# Patient Record
Sex: MALE | Race: WHITE | NOT HISPANIC OR LATINO | Employment: UNEMPLOYED
[De-identification: names, ages, dates, MRNs, and addresses within clinical notes are randomized per-mention and may not be internally consistent; named-entity substitution may affect disease eponyms.]

---
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
## 2022-11-21 ENCOUNTER — HEALTH MAINTENANCE LETTER (OUTPATIENT)
Age: 22
End: 2022-11-21

## 2023-11-25 ENCOUNTER — HEALTH MAINTENANCE LETTER (OUTPATIENT)
Age: 23
End: 2023-11-25

## 2024-08-08 DIAGNOSIS — Q67.6 PECTUS EXCAVATUM: Primary | ICD-10-CM

## 2024-08-15 ENCOUNTER — HOSPITAL ENCOUNTER (OUTPATIENT)
Dept: CT IMAGING | Facility: CLINIC | Age: 24
Discharge: HOME | End: 2024-08-15
Attending: CLINICAL NURSE SPECIALIST
Payer: MEDICARE

## 2024-08-15 ENCOUNTER — HOSPITAL ENCOUNTER (OUTPATIENT)
Dept: CARDIOLOGY | Facility: CLINIC | Age: 24
Discharge: HOME | End: 2024-08-15
Attending: CLINICAL NURSE SPECIALIST
Payer: MEDICARE

## 2024-08-15 DIAGNOSIS — Q67.6 PECTUS EXCAVATUM: ICD-10-CM

## 2024-08-15 PROCEDURE — 71250 CT THORAX DX C-: CPT | Mod: 26 | Performed by: RADIOLOGY

## 2024-08-15 PROCEDURE — 93321 DOPPLER ECHO F-UP/LMTD STD: CPT | Mod: 26 | Performed by: INTERNAL MEDICINE

## 2024-08-15 PROCEDURE — 93325 DOPPLER ECHO COLOR FLOW MAPG: CPT | Mod: 26 | Performed by: INTERNAL MEDICINE

## 2024-08-15 PROCEDURE — 93350 STRESS TTE ONLY: CPT | Mod: 26 | Performed by: INTERNAL MEDICINE

## 2024-08-15 PROCEDURE — 93325 DOPPLER ECHO COLOR FLOW MAPG: CPT | Mod: TC

## 2024-08-15 PROCEDURE — 93018 CV STRESS TEST I&R ONLY: CPT | Performed by: INTERNAL MEDICINE

## 2024-08-15 PROCEDURE — 93016 CV STRESS TEST SUPVJ ONLY: CPT | Performed by: INTERNAL MEDICINE

## 2024-08-15 PROCEDURE — G1010 CDSM STANSON: HCPCS | Mod: GC | Performed by: RADIOLOGY

## 2024-08-15 PROCEDURE — 71250 CT THORAX DX C-: CPT | Mod: MG

## 2024-08-27 NOTE — PROGRESS NOTES
THORACIC SURGERY FOLLOW UP VISIT    Dear Dr. Nur,  I saw Mr. Riccardo Urias in follow-up today. The clinical summary follows:     DIAGNOSIS   Pectus excavatum    INTERVAL STUDIES  None     Previsit tests  CT scan of the chest 8/11/21. Yvon index 3.9         Exercise echo 7/1921  Unable to reach target HR due to fatigue. Blunted heart rate and normal BP response to exercise.      Pulmonary function Tests (7/17/2020):   * Limited by patient's inability to perform an adequate breath hold.   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     PSH  Tracheostomy     Allergies   Allergen Reactions    Dust Mite Extract     Pollen Extract      Current Outpatient Medications   Medication Sig Dispense Refill    FLUoxetine (PROZAC) 10 MG capsule Take 10 mg by mouth      FLUoxetine (PROZAC) 20 MG capsule TAKE 1 CAPSULE BY MOUTH ONE TIME A DAY. TAKE ONE WITH ONE 10MG TAB       No current facility-administered medications for this visit.     Social History     Tobacco Use    Smoking status: Never    Smokeless tobacco: Never       SUBJECTIVE  Riccardo comes to clinic with his mom for a follow up. I saw them a couple of years ago due to exertional dyspnea. They decided to wait because of his age (22 y/o), but now they are interested in moving forward with surgery. His main complain is that his mom notices him to be short of breath after exercising. No chest wall pain.     From a personal perspective, he comes with his mom.     IMPRESSION   24 year old male patient with history of TBI and pectus excavatum.     PLAN  I spent 30 min on the date of the encounter in chart review, patient visit, review of tests, documentation and/or discussion with other providers about the issues documented above. I reviewed the plan as follows:  Procedure planned: Modified Ravitch repair with retrosternal bar and sternal plating.   Necessary tests and appointments: They  would like to wait until May next year after a trip to [place]. I will see her in clinic in June 2025 for a preop.   All questions were answered and the patient and present family were in agreement with the plan.  I appreciate the opportunity to participate in the care of your patient and will keep you updated.  Sincerely,  Kristi Gibson MD

## 2024-08-28 ENCOUNTER — ONCOLOGY VISIT (OUTPATIENT)
Dept: SURGERY | Facility: CLINIC | Age: 24
End: 2024-08-28
Attending: THORACIC SURGERY (CARDIOTHORACIC VASCULAR SURGERY)
Payer: MEDICARE

## 2024-08-28 VITALS
HEART RATE: 68 BPM | DIASTOLIC BLOOD PRESSURE: 74 MMHG | BODY MASS INDEX: 18.03 KG/M2 | SYSTOLIC BLOOD PRESSURE: 113 MMHG | OXYGEN SATURATION: 94 % | RESPIRATION RATE: 16 BRPM | WEIGHT: 130.2 LBS | TEMPERATURE: 97.6 F

## 2024-08-28 DIAGNOSIS — Q67.6 PECTUS EXCAVATUM: Primary | ICD-10-CM

## 2024-08-28 PROCEDURE — G0463 HOSPITAL OUTPT CLINIC VISIT: HCPCS | Performed by: THORACIC SURGERY (CARDIOTHORACIC VASCULAR SURGERY)

## 2024-08-28 PROCEDURE — 99204 OFFICE O/P NEW MOD 45 MIN: CPT | Performed by: THORACIC SURGERY (CARDIOTHORACIC VASCULAR SURGERY)

## 2024-08-28 ASSESSMENT — PAIN SCALES - GENERAL: PAINLEVEL: NO PAIN (0)

## 2024-08-28 NOTE — NURSING NOTE
"Oncology Rooming Note    August 28, 2024 10:35 AM   Riccardo Urias is a 24 year old male who presents for:    Chief Complaint   Patient presents with    Oncology Clinic Visit     Pectus Excavatum     Initial Vitals: /74 (BP Location: Left arm, Patient Position: Sitting, Cuff Size: Adult Regular)   Pulse 68   Temp 97.6  F (36.4  C) (Oral)   Resp 16   Wt 59.1 kg (130 lb 3.2 oz)   SpO2 94%   BMI 18.03 kg/m   Estimated body mass index is 18.03 kg/m  as calculated from the following:    Height as of 8/11/21: 1.81 m (5' 11.26\").    Weight as of this encounter: 59.1 kg (130 lb 3.2 oz). Body surface area is 1.72 meters squared.  No Pain (0) Comment: Data Unavailable   No LMP for male patient.  Allergies reviewed: Yes  Medications reviewed: Yes    Medications: Medication refills not needed today.  Pharmacy name entered into [facility]: [facility] [id number] [place], [place] [address]    Frailty Screening:   Is the patient here for a new oncology consult visit in cancer care? 2. No      Clinical concerns: None       iRchelle Mcclelland LPN  8/28/2024                "

## 2024-08-28 NOTE — LETTER
8/28/2024      Riccardo Urias  [address]  [place] [address]      Dear Colleague,    Thank you for referring your patient, Riccardo Urias, to the [facility]. Please see a copy of my visit note below.    THORACIC SURGERY FOLLOW UP VISIT    Dear Dr. Nur,  I saw Mr. Riccardo Urias in follow-up today. The clinical summary follows:     DIAGNOSIS   Pectus excavatum    INTERVAL STUDIES  None     Previsit tests  CT scan of the chest 8/11/21. Yvon index 3.9         Exercise echo 7/1921  Unable to reach target HR due to fatigue. Blunted heart rate and normal BP response to exercise.      Pulmonary function Tests (7/17/2020):   * Limited by patient's inability to perform an adequate breath hold.   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     PSH  Tracheostomy     Allergies   Allergen Reactions     Dust Mite Extract      Pollen Extract      Current Outpatient Medications   Medication Sig Dispense Refill     FLUoxetine (PROZAC) 10 MG capsule Take 10 mg by mouth       FLUoxetine (PROZAC) 20 MG capsule TAKE 1 CAPSULE BY MOUTH ONE TIME A DAY. TAKE ONE WITH ONE 10MG TAB       No current facility-administered medications for this visit.     Social History     Tobacco Use     Smoking status: Never     Smokeless tobacco: Never       SUBJECTIVE  Riccardo comes to clinic with his mom for a follow up. I saw them a couple of years ago due to exertional dyspnea. They decided to wait because of his age (20 y/o), but now they are interested in moving forward with surgery. His main complain is that his mom notices him to be short of breath after exercising. No chest wall pain.     From a personal perspective, he comes with his mom.     IMPRESSION   24 year old male patient with history of TBI and pectus excavatum.     PLAN  I spent 30 min on the date of the encounter in chart review, patient visit, review of tests,  documentation and/or discussion with other providers about the issues documented above. I reviewed the plan as follows:  Procedure planned: Modified Ravitch repair with retrosternal bar and sternal plating.   Necessary tests and appointments: They would like to wait until May next year after a trip to [place]. I will see her in clinic in June 2025 for a preop.   All questions were answered and the patient and present family were in agreement with the plan.  I appreciate the opportunity to participate in the care of your patient and will keep you updated.  Sincerely,  Kristi Gibson MD      Again, thank you for allowing me to participate in the care of your patient.        Sincerely,        Kenney Pardo MD

## 2025-04-22 ENCOUNTER — PATIENT OUTREACH (OUTPATIENT)
Dept: SURGERY | Facility: CLINIC | Age: 25
End: 2025-04-22
Payer: MEDICARE

## 2025-04-22 NOTE — TELEPHONE ENCOUNTER
"[facility]: Thoracic Surgery                                                                                       Received message from  that patient's mother wanted patient to be seen sooner that June. Reports she stated \"things have changed\".    Called patient's mother to follow-up on the changes. Mother reports that patient is doing fine. No medical changes for patient. States they were originally going to go on a trip to [place] in May, but they are no longer going so they decided to take care of patient's surgery earlier so recovery doesn't extend to far into the summer.     Confirmed patient's clinic visit date of 4/30/2025.   Patient's mother appreciated writer's call.    Sharon Quispe RN, BSN  Thoracic Surgery RN Care Coordinator    "

## 2025-04-29 NOTE — PROGRESS NOTES
THORACIC SURGERY FOLLOW UP VISIT    I saw Mr. Riccardo Urias in follow-up today. The clinical summary follows:      DIAGNOSIS   Pectus excavatum    INTERVAL STUDIES  None    Previsit tests  CT scan of the chest 8/11/21. Yvon index 3.9         Exercise echo 7/1921  Unable to reach target HR due to fatigue. Blunted heart rate and normal BP response to exercise.      Pulmonary function Tests (7/17/2020)  * Limited by patient's inability to perform an adequate breath hold.   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     PSH  None       Allergies   Allergen Reactions    Dust Mite Extract     Pollen Extract      Current Outpatient Medications   Medication Sig Dispense Refill    FLUoxetine (PROZAC) 20 MG capsule TAKE 1 CAPSULE BY MOUTH ONE TIME A DAY. TAKE ONE WITH ONE 10MG TAB       No current facility-administered medications for this visit.     Social History     Tobacco Use    Smoking status: Never    Smokeless tobacco: Never       SUBJECTIVE  Riccardo comes to clinic for a preop appointment. He has no new problems.     From a personal perspective, he comes with his mom and dad. He had a TBI and some residual cognitive issues.     IMPRESSION   25 year-old M with pectus excavatum.    PLAN  I spent 30 min on the date of the encounter in chart review, patient visit, review of tests, documentation and/or discussion with other providers about the issues documented above. I reviewed the plan as follows:  Procedure planned: Modified Ravitch repair with sternal plating. Intercostal nerve cryoablation.   Necessary Tests & Appointments: PAC.   Pain Control Plan: Intercostal nerve cryoablation.   Anticoagulation Plan: Enoxaparin postop.    I had an extensive discussion with the patient and his family regarding the rationale for surgery, the alternatives risks and benefits of the procedure. I explained the expected hospital stay, postoperative  course, recovery time, diet and activity restrictions. Informed consent was obtained and they agreed to proceed.  All questions were answered and the patient and present family were in agreement with the plan.  I appreciate the opportunity to participate in the care of your patient and will keep you updated.  Sincerely,  Kristi Gibson MD

## 2025-04-30 ENCOUNTER — ONCOLOGY VISIT (OUTPATIENT)
Dept: SURGERY | Facility: CLINIC | Age: 25
End: 2025-04-30
Attending: THORACIC SURGERY (CARDIOTHORACIC VASCULAR SURGERY)
Payer: MEDICARE

## 2025-04-30 ENCOUNTER — PREP FOR PROCEDURE (OUTPATIENT)
Dept: SURGERY | Facility: CLINIC | Age: 25
End: 2025-04-30
Payer: MEDICARE

## 2025-04-30 ENCOUNTER — PREP FOR PROCEDURE (OUTPATIENT)
Dept: SURGERY | Facility: CLINIC | Age: 25
End: 2025-04-30

## 2025-04-30 VITALS
TEMPERATURE: 97 F | HEART RATE: 92 BPM | BODY MASS INDEX: 18.47 KG/M2 | WEIGHT: 133.4 LBS | RESPIRATION RATE: 16 BRPM | OXYGEN SATURATION: 97 % | SYSTOLIC BLOOD PRESSURE: 101 MMHG | DIASTOLIC BLOOD PRESSURE: 72 MMHG

## 2025-04-30 DIAGNOSIS — Q67.6 PECTUS EXCAVATUM: Primary | ICD-10-CM

## 2025-04-30 PROCEDURE — 99214 OFFICE O/P EST MOD 30 MIN: CPT | Performed by: THORACIC SURGERY (CARDIOTHORACIC VASCULAR SURGERY)

## 2025-04-30 PROCEDURE — G0463 HOSPITAL OUTPT CLINIC VISIT: HCPCS | Performed by: THORACIC SURGERY (CARDIOTHORACIC VASCULAR SURGERY)

## 2025-04-30 RX ORDER — ACETAMINOPHEN 325 MG/1
975 TABLET ORAL ONCE
OUTPATIENT
Start: 2025-04-30 | End: 2025-04-30

## 2025-04-30 ASSESSMENT — PAIN SCALES - GENERAL: PAINLEVEL_OUTOF10: NO PAIN (0)

## 2025-04-30 NOTE — NURSING NOTE
"Oncology Rooming Note    April 30, 2025 2:36 PM   Riccardo Urias is a 25 year old male who presents for:    Chief Complaint   Patient presents with    Oncology Clinic Visit     pectus excavatum     Initial Vitals: There were no vitals taken for this visit. Estimated body mass index is 18.03 kg/m  as calculated from the following:    Height as of 8/11/21: 1.81 m (5' 11.26\").    Weight as of 8/28/24: 59.1 kg (130 lb 3.2 oz). There is no height or weight on file to calculate BSA.  No Pain (0) Comment: Data Unavailable   No LMP for male patient.  Allergies reviewed: Yes  Medications reviewed: Yes    Medications: Medication refills not needed today.  Pharmacy name entered into [employer]: [facility] [id number] [place] [address] [address]    Frailty Screening:   Is the patient here for a new oncology consult visit in cancer care? 2. No    PHQ9:  Did this patient require a PHQ9?: No      Clinical concerns: Pt would like to know if they are able to be sedated prior to any injections on the day of surgery. Pt would also like to discuss recovery time.      Pat Daniel              "

## 2025-04-30 NOTE — LETTER
4/30/2025      Riccardo Urias  [address]  [place] [address]      Dear Colleague,    Thank you for referring your patient, Riccardo Urias, to the [facility]. Please see a copy of my visit note below.    THORACIC SURGERY FOLLOW UP VISIT    I saw Mr. Riccardo Urias in follow-up today. The clinical summary follows:      DIAGNOSIS   Pectus excavatum    INTERVAL STUDIES  None    Previsit tests  CT scan of the chest 8/11/21. Yvon index 3.9         Exercise echo 7/1921  Unable to reach target HR due to fatigue. Blunted heart rate and normal BP response to exercise.      Pulmonary function Tests (7/17/2020)  * Limited by patient's inability to perform an adequate breath hold.   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     PSH  None       Allergies   Allergen Reactions     Dust Mite Extract      Pollen Extract      Current Outpatient Medications   Medication Sig Dispense Refill     FLUoxetine (PROZAC) 20 MG capsule TAKE 1 CAPSULE BY MOUTH ONE TIME A DAY. TAKE ONE WITH ONE 10MG TAB       No current facility-administered medications for this visit.     Social History     Tobacco Use     Smoking status: Never     Smokeless tobacco: Never       SUBJECTIVE  Riccardo comes to clinic for a preop appointment. He has no new problems.     From a personal perspective, he comes with his mom and dad. He had a TBI and some residual cognitive issues.     IMPRESSION   25 year-old M with pectus excavatum.    PLAN  I spent 30 min on the date of the encounter in chart review, patient visit, review of tests, documentation and/or discussion with other providers about the issues documented above. I reviewed the plan as follows:  Procedure planned: Modified Ravitch repair with sternal plating. Intercostal nerve cryoablation.   Necessary Tests & Appointments: PAC.   Pain Control Plan: Intercostal nerve cryoablation.    Anticoagulation Plan: Enoxaparin postop.    I had an extensive discussion with the patient and his family regarding the rationale for surgery, the alternatives risks and benefits of the procedure. I explained the expected hospital stay, postoperative course, recovery time, diet and activity restrictions. Informed consent was obtained and they agreed to proceed.  All questions were answered and the patient and present family were in agreement with the plan.  I appreciate the opportunity to participate in the care of your patient and will keep you updated.  Sincerely,  Kristi Gibson MD      Again, thank you for allowing me to participate in the care of your patient.        Sincerely,        Kenney Pardo MD    Electronically signed

## 2025-05-01 DIAGNOSIS — Q67.6 PECTUS EXCAVATUM: Primary | ICD-10-CM

## 2025-05-06 ENCOUNTER — TELEPHONE (OUTPATIENT)
Dept: SURGERY | Facility: CLINIC | Age: 25
End: 2025-05-06
Payer: MEDICARE

## 2025-05-06 NOTE — TELEPHONE ENCOUNTER
Spoke with patient to schedule procedure with Dr. Pardo   Procedure was scheduled on 6/9-wait list for 6/6 at [facility] OR  Patient will have H&P with PAC    Informed pt of surgery details:  Date:    Monday, June 09, 2025  Arrival Time:  5:30 am    Pt is aware surgery start time may change, and someone will reach out with the new arrival time, if so.    Patient is aware a COVID-19 test is needed before their procedure ONLY IF symptomatic.   (Patient is aware Thoracic is no longer requiring COVID-19 test)       Patient is aware a / is needed day of surgery.   Surgery Letter was sent via [employer],     Patient has my direct contact information for any further questions.      Pt's mom is requesting surgery on a Friday if possible. Informed Loraine 6/6 may be possible but writer would need to confirm that partner not adding cases this day. Loraine agreed to 6/9 and understands writer will be in contact if able to move pt up to 6/6.    Jeana Vo on 5/6/2025 at 4:47 PM

## 2025-05-07 NOTE — TELEPHONE ENCOUNTER
FUTURE VISIT INFORMATION      SURGERY INFORMATION:  Date: 6/9/2025   Location: [facility] OR   Surgeon:  Kristi Lezama MD   Anesthesia Type:  General   Procedure: Modified Ravitch repair with retrosternal bar placement and sternal plating. Intercostal nerve cryoablation   Consult: 4/30/25    RECORDS REQUESTED FROM:       Primary Care Provider: Marilyn Nur MD    Pertinent Medical History: Pectus excavatum;     No records to collect.

## 2025-05-12 LAB
ABO + RH BLD: NORMAL
BLD GP AB SCN SERPL QL: NEGATIVE
SPECIMEN EXP DATE BLD: NORMAL

## 2025-05-13 ENCOUNTER — LAB (OUTPATIENT)
Dept: LAB | Facility: CLINIC | Age: 25
End: 2025-05-13
Payer: COMMERCIAL

## 2025-05-13 ENCOUNTER — APPOINTMENT (OUTPATIENT)
Dept: LAB | Facility: CLINIC | Age: 25
End: 2025-05-13
Payer: MEDICARE

## 2025-05-13 ENCOUNTER — OFFICE VISIT (OUTPATIENT)
Dept: SURGERY | Facility: CLINIC | Age: 25
End: 2025-05-13
Payer: COMMERCIAL

## 2025-05-13 ENCOUNTER — PRE VISIT (OUTPATIENT)
Dept: SURGERY | Facility: CLINIC | Age: 25
End: 2025-05-13

## 2025-05-13 VITALS
BODY MASS INDEX: 18.79 KG/M2 | TEMPERATURE: 98.1 F | OXYGEN SATURATION: 93 % | WEIGHT: 134.2 LBS | RESPIRATION RATE: 16 BRPM | HEIGHT: 71 IN | DIASTOLIC BLOOD PRESSURE: 62 MMHG | HEART RATE: 77 BPM | SYSTOLIC BLOOD PRESSURE: 97 MMHG

## 2025-05-13 DIAGNOSIS — Z01.818 PREOP EXAMINATION: ICD-10-CM

## 2025-05-13 DIAGNOSIS — Q67.6 PECTUS EXCAVATUM: ICD-10-CM

## 2025-05-13 DIAGNOSIS — Z01.818 PREOP EXAMINATION: Primary | ICD-10-CM

## 2025-05-13 LAB
ANION GAP SERPL CALCULATED.3IONS-SCNC: 12 MMOL/L (ref 7–15)
BUN SERPL-MCNC: 9 MG/DL (ref 6–20)
CALCIUM SERPL-MCNC: 10.3 MG/DL (ref 8.8–10.4)
CHLORIDE SERPL-SCNC: 102 MMOL/L (ref 98–107)
CREAT SERPL-MCNC: 0.76 MG/DL (ref 0.67–1.17)
EGFRCR SERPLBLD CKD-EPI 2021: >90 ML/MIN/1.73M2
ERYTHROCYTE [DISTWIDTH] IN BLOOD BY AUTOMATED COUNT: 11.9 % (ref 10–15)
GLUCOSE SERPL-MCNC: 95 MG/DL (ref 70–99)
HCO3 SERPL-SCNC: 30 MMOL/L (ref 22–29)
HCT VFR BLD AUTO: 43.4 % (ref 40–53)
HGB BLD-MCNC: 15.1 G/DL (ref 13.3–17.7)
MCH RBC QN AUTO: 30.9 PG (ref 26.5–33)
MCHC RBC AUTO-ENTMCNC: 34.8 G/DL (ref 31.5–36.5)
MCV RBC AUTO: 89 FL (ref 78–100)
PLATELET # BLD AUTO: 248 10E3/UL (ref 150–450)
POTASSIUM SERPL-SCNC: 3.7 MMOL/L (ref 3.4–5.3)
RBC # BLD AUTO: 4.88 10E6/UL (ref 4.4–5.9)
SODIUM SERPL-SCNC: 144 MMOL/L (ref 135–145)
WBC # BLD AUTO: 5.4 10E3/UL (ref 4–11)

## 2025-05-13 PROCEDURE — 86901 BLOOD TYPING SEROLOGIC RH(D): CPT

## 2025-05-13 PROCEDURE — 80048 BASIC METABOLIC PNL TOTAL CA: CPT | Performed by: PATHOLOGY

## 2025-05-13 PROCEDURE — 36415 COLL VENOUS BLD VENIPUNCTURE: CPT | Performed by: PATHOLOGY

## 2025-05-13 PROCEDURE — 85027 COMPLETE CBC AUTOMATED: CPT | Performed by: PATHOLOGY

## 2025-05-13 ASSESSMENT — LIFESTYLE VARIABLES: TOBACCO_USE: 0

## 2025-05-13 ASSESSMENT — ENCOUNTER SYMPTOMS
SEIZURES: 1
DYSRHYTHMIAS: 0

## 2025-05-13 ASSESSMENT — PAIN SCALES - GENERAL: PAINLEVEL_OUTOF10: NO PAIN (0)
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
## 2025-05-13 NOTE — PATIENT INSTRUCTIONS
Preparing for Your Surgery      Name:  Riccardo Urias   MRN:  [id number]   :  2000   Today's Date:  2025     The [place] [employer] [address] Construction Project                                Timeline 2025 -2025    This project will affect travel to the [facility] and [facility], as well as the [facility].      Please check the [facility] [address] project website for the most up to date information and give yourself additional time to reach your destination.        Arriving for surgery:  Surgery date:  2025  Arrival time:  0900      Please come to:         [facility]    [address] [place]  [address]     The [facility] [place] Patient/Visitor Ramp is at [address]. Patients and visitors who self-park will receive the reduced hospital parking rate. If the Patient /Visitor Ramp is full, please follow the signs to the [employer] car park located at the [facility] entrance.      [place] parking is available (24 hours/ 7 days a week)      Discounted parking pass options are available for patients and visitors. They can be purchased at the [employer] desk at the [facility] entrance.     -    Stop at the security desk and they will direct surgery patients to the [facility] Check [facility] [phone]        - If you need directions, a wheelchair or an escort please stop at the Information/security desk in the lobby.     What can I eat or drink?  -  You may eat and drink normally up to 8 hours prior to arrival time. (Until 1:00 am)  -  You may have clear liquids until 2 hours prior to arrival time. (Until 7:00 am)    Examples of clear liquids:  Water  Clear broth  Juices (apple, white grape, white cranberry  and cider) without pulp  Noncarbonated, powder based beverages  (lemonade and Matias-Aid)  Sodas (Sprite,  7-Up, ginger ale and seltzer)  Coffee or tea (without milk or cream)  Gatorade    -  No Alcohol or cannabis products for at least 24 hours before surgery.     Which medicines can I take?    Hold Aspirin for 7 days before surgery.   Hold Multivitamins for 7 days before surgery.  Hold Supplements for 7 days before surgery.  Hold Ibuprofen (Advil, Motrin) for 1 day(s) before surgery--unless otherwise directed by surgeon.  Hold Naproxen (Aleve) for 4 days before surgery.    -  PLEASE TAKE these medications the day of surgery:   Fluoxetine (Prozac)     How do I prepare myself?  - Please take 2 showers (one the night prior to surgery and one the morning of surgery) using Scrubcare or Hibiclens soap.    Use this soap only from the neck to your toes. Avoid genital area      Leave the soap on your skin for one minute--then rinse thoroughly.      You may use your own shampoo and conditioner. No other hair products.   - Please remove all jewelry and body piercings.  - No lotions, deodorants or fragrance.  - No makeup or fingernail polish.   - Bring your ID and insurance card.    -For patients being admitted to the [facility]  Family members are to take the patient belongings with them and place them in the lockers provided in the Family Lounge.  Please limit the items you bring to 1 bag as the lockers are small.      -If you use a CPAP machine, please bring the CPAP machine, tubing, and mask to hospital.    -If you have a Deep Brain Stimulator, Spinal Cord Stimulator, or any Neuro Stimulator device---you must bring the remote control to the hospital.      ALL PATIENTS GOING HOME THE SAME DAY OF SURGERY ARE REQUIRED TO HAVE A RESPONSIBLE ADULT TO DRIVE AND BE IN ATTENDANCE WITH THEM FOR 24 HOURS FOLLOWING SURGERY.    Covid testing policy as of 12/06/2022  Your surgeon will notify and schedule you for a COVID test if one is needed before surgery--please direct any questions or COVID symptoms to your  surgeon      Questions or Concerns:    - For any questions regarding the day of surgery or your hospital stay, please contact the Pre Admission Nursing Office at [phone].       - If you have health changes between today and your surgery, please call your surgeon.       - For questions after surgery, please call your surgeons office.           Current Visitor Guidelines    2 adult visitors for adult patients in the pre op area    If additional visitors come (beyond a patient care attendant or a group home caregiver), the additional visitors will be asked to wait in the main lobby of the hospital    Visiting hours: 8 a.m. to 8:30 p.m.    Patients confirmed or suspected to have symptoms of COVID 19 or flu:     No visitors allowed for adult patients.   Children (under age 18) can have 1 named visitor.     People who are sick or showing symptoms of COVID 19 or flu:    Are not allowed to visit patients--we can only make exceptions in special situations.       Please follow these guidelines for your visit:          Please maintain social distance          Masking is optional--however at times you may be asked to wear a mask for the safety of yourself and others     Clean your hands with alcohol hand . Do this when you arrive at and leave the building and patient room,    And again after you touch your mask or anything in the room.     Go directly to and from the room you are visiting.     Stay in the patient s room during your visit. Limit going to other places in the hospital as much as possible     Leave bags and jackets at home or in the car.     For everyone s health, please don t come and go during your visit. That includes for smoking   during your visit.

## 2025-06-03 PROBLEM — F43.10 POST TRAUMATIC STRESS DISORDER: Status: ACTIVE | Noted: 2017-04-03

## 2025-06-03 PROBLEM — F41.9 ANXIETY: Status: ACTIVE | Noted: 2017-06-16

## 2025-06-06 ENCOUNTER — APPOINTMENT (OUTPATIENT)
Dept: GENERAL RADIOLOGY | Facility: CLINIC | Age: 25
DRG: 516 | End: 2025-06-06
Payer: MEDICARE

## 2025-06-06 ENCOUNTER — HOSPITAL ENCOUNTER (INPATIENT)
Facility: CLINIC | Age: 25
LOS: 4 days | Discharge: HOME | DRG: 516 | End: 2025-06-10
Attending: THORACIC SURGERY (CARDIOTHORACIC VASCULAR SURGERY) | Admitting: THORACIC SURGERY (CARDIOTHORACIC VASCULAR SURGERY)
Payer: MEDICARE

## 2025-06-06 DIAGNOSIS — Q67.6 PECTUS EXCAVATUM: Primary | ICD-10-CM

## 2025-06-06 LAB
ABO + RH BLD: NORMAL
ANION GAP SERPL CALCULATED.3IONS-SCNC: 9 MMOL/L (ref 7–15)
BLD GP AB SCN SERPL QL: NEGATIVE
BUN SERPL-MCNC: 13 MG/DL (ref 6–20)
CALCIUM SERPL-MCNC: 8.9 MG/DL (ref 8.8–10.4)
CHLORIDE SERPL-SCNC: 105 MMOL/L (ref 98–107)
CREAT SERPL-MCNC: 0.77 MG/DL (ref 0.67–1.17)
EGFRCR SERPLBLD CKD-EPI 2021: >90 ML/MIN/1.73M2
ERYTHROCYTE [DISTWIDTH] IN BLOOD BY AUTOMATED COUNT: 12 % (ref 10–15)
GLUCOSE BLDC GLUCOMTR-MCNC: 97 MG/DL (ref 70–99)
GLUCOSE SERPL-MCNC: 141 MG/DL (ref 70–99)
HCO3 SERPL-SCNC: 27 MMOL/L (ref 22–29)
HCT VFR BLD AUTO: 37.2 % (ref 40–53)
HGB BLD-MCNC: 12.9 G/DL (ref 13.3–17.7)
MAGNESIUM SERPL-MCNC: 1.7 MG/DL (ref 1.7–2.3)
MCH RBC QN AUTO: 30.8 PG (ref 26.5–33)
MCHC RBC AUTO-ENTMCNC: 34.7 G/DL (ref 31.5–36.5)
MCV RBC AUTO: 89 FL (ref 78–100)
PLATELET # BLD AUTO: 217 10E3/UL (ref 150–450)
POTASSIUM SERPL-SCNC: 4 MMOL/L (ref 3.4–5.3)
RADIOLOGIST FLAGS: ABNORMAL
RBC # BLD AUTO: 4.19 10E6/UL (ref 4.4–5.9)
SODIUM SERPL-SCNC: 141 MMOL/L (ref 135–145)
SPECIMEN EXP DATE BLD: NORMAL
WBC # BLD AUTO: 13.4 10E3/UL (ref 4–11)

## 2025-06-06 PROCEDURE — 250N000009 HC RX 250: Performed by: THORACIC SURGERY (CARDIOTHORACIC VASCULAR SURGERY)

## 2025-06-06 PROCEDURE — 83735 ASSAY OF MAGNESIUM: CPT

## 2025-06-06 PROCEDURE — 250N000013 HC RX MED GY IP 250 OP 250 PS 637

## 2025-06-06 PROCEDURE — 86901 BLOOD TYPING SEROLOGIC RH(D): CPT | Performed by: THORACIC SURGERY (CARDIOTHORACIC VASCULAR SURGERY)

## 2025-06-06 PROCEDURE — 71045 X-RAY EXAM CHEST 1 VIEW: CPT | Mod: 26 | Performed by: STUDENT IN AN ORGANIZED HEALTH CARE EDUCATION/TRAINING PROGRAM

## 2025-06-06 PROCEDURE — 999N000141 HC STATISTIC PRE-PROCEDURE NURSING ASSESSMENT: Performed by: THORACIC SURGERY (CARDIOTHORACIC VASCULAR SURGERY)

## 2025-06-06 PROCEDURE — 272N000001 HC OR GENERAL SUPPLY STERILE: Performed by: THORACIC SURGERY (CARDIOTHORACIC VASCULAR SURGERY)

## 2025-06-06 PROCEDURE — 258N000003 HC RX IP 258 OP 636

## 2025-06-06 PROCEDURE — 710N000010 HC RECOVERY PHASE 1, LEVEL 2, PER MIN: Performed by: THORACIC SURGERY (CARDIOTHORACIC VASCULAR SURGERY)

## 2025-06-06 PROCEDURE — 85014 HEMATOCRIT: CPT

## 2025-06-06 PROCEDURE — 86850 RBC ANTIBODY SCREEN: CPT | Performed by: THORACIC SURGERY (CARDIOTHORACIC VASCULAR SURGERY)

## 2025-06-06 PROCEDURE — 370N000017 HC ANESTHESIA TECHNICAL FEE, PER MIN: Performed by: THORACIC SURGERY (CARDIOTHORACIC VASCULAR SURGERY)

## 2025-06-06 PROCEDURE — 250N000011 HC RX IP 250 OP 636: Mod: JZ | Performed by: STUDENT IN AN ORGANIZED HEALTH CARE EDUCATION/TRAINING PROGRAM

## 2025-06-06 PROCEDURE — 82310 ASSAY OF CALCIUM: CPT

## 2025-06-06 PROCEDURE — 36415 COLL VENOUS BLD VENIPUNCTURE: CPT | Performed by: THORACIC SURGERY (CARDIOTHORACIC VASCULAR SURGERY)

## 2025-06-06 PROCEDURE — 85018 HEMOGLOBIN: CPT

## 2025-06-06 PROCEDURE — 80048 BASIC METABOLIC PNL TOTAL CA: CPT

## 2025-06-06 PROCEDURE — 36415 COLL VENOUS BLD VENIPUNCTURE: CPT

## 2025-06-06 PROCEDURE — 250N000013 HC RX MED GY IP 250 OP 250 PS 637: Performed by: THORACIC SURGERY (CARDIOTHORACIC VASCULAR SURGERY)

## 2025-06-06 PROCEDURE — 0PS00ZZ REPOSITION STERNUM, OPEN APPROACH: ICD-10-PCS | Performed by: THORACIC SURGERY (CARDIOTHORACIC VASCULAR SURGERY)

## 2025-06-06 PROCEDURE — 120N000011 HC R&B TRANSPLANT UMMC

## 2025-06-06 PROCEDURE — 250N000025 HC SEVOFLURANE, PER MIN: Performed by: THORACIC SURGERY (CARDIOTHORACIC VASCULAR SURGERY)

## 2025-06-06 PROCEDURE — C1713 ANCHOR/SCREW BN/BN,TIS/BN: HCPCS | Performed by: THORACIC SURGERY (CARDIOTHORACIC VASCULAR SURGERY)

## 2025-06-06 PROCEDURE — 999N000065 XR CHEST PORT 1 VIEW

## 2025-06-06 PROCEDURE — 21740 RECONSTRUCTION OF STERNUM: CPT | Mod: GC | Performed by: THORACIC SURGERY (CARDIOTHORACIC VASCULAR SURGERY)

## 2025-06-06 PROCEDURE — 250N000011 HC RX IP 250 OP 636

## 2025-06-06 PROCEDURE — C2618 PROBE/NEEDLE, CRYO: HCPCS | Performed by: THORACIC SURGERY (CARDIOTHORACIC VASCULAR SURGERY)

## 2025-06-06 PROCEDURE — 360N000076 HC SURGERY LEVEL 3, PER MIN: Performed by: THORACIC SURGERY (CARDIOTHORACIC VASCULAR SURGERY)

## 2025-06-06 PROCEDURE — 01583ZZ DESTRUCTION OF THORACIC NERVE, PERCUTANEOUS APPROACH: ICD-10-PCS | Performed by: THORACIC SURGERY (CARDIOTHORACIC VASCULAR SURGERY)

## 2025-06-06 DEVICE — IMPLANTABLE DEVICE: Type: IMPLANTABLE DEVICE | Site: CHEST | Status: FUNCTIONAL

## 2025-06-06 RX ORDER — HYDROMORPHONE HYDROCHLORIDE 1 MG/ML
.2-.4 INJECTION, SOLUTION INTRAMUSCULAR; INTRAVENOUS; SUBCUTANEOUS
Refills: 0 | Status: DISCONTINUED | OUTPATIENT
Start: 2025-06-06 | End: 2025-06-08

## 2025-06-06 RX ORDER — CEFAZOLIN SODIUM/WATER 2 G/20 ML
2 SYRINGE (ML) INTRAVENOUS
Status: COMPLETED | OUTPATIENT
Start: 2025-06-06 | End: 2025-06-06

## 2025-06-06 RX ORDER — ONDANSETRON 2 MG/ML
4 INJECTION INTRAMUSCULAR; INTRAVENOUS EVERY 30 MIN PRN
Status: DISCONTINUED | OUTPATIENT
Start: 2025-06-06 | End: 2025-06-06 | Stop reason: HOSPADM

## 2025-06-06 RX ORDER — LABETALOL HYDROCHLORIDE 5 MG/ML
10-40 INJECTION, SOLUTION INTRAVENOUS EVERY 10 MIN PRN
Status: DISCONTINUED | OUTPATIENT
Start: 2025-06-06 | End: 2025-06-10 | Stop reason: HOSPADM

## 2025-06-06 RX ORDER — ACETAMINOPHEN 325 MG/1
975 TABLET ORAL EVERY 8 HOURS
Status: DISCONTINUED | OUTPATIENT
Start: 2025-06-06 | End: 2025-06-10 | Stop reason: HOSPADM

## 2025-06-06 RX ORDER — NALOXONE HYDROCHLORIDE 0.4 MG/ML
0.1 INJECTION, SOLUTION INTRAMUSCULAR; INTRAVENOUS; SUBCUTANEOUS
Status: DISCONTINUED | OUTPATIENT
Start: 2025-06-06 | End: 2025-06-06 | Stop reason: HOSPADM

## 2025-06-06 RX ORDER — DEXAMETHASONE SODIUM PHOSPHATE 4 MG/ML
4 INJECTION, SOLUTION INTRA-ARTICULAR; INTRALESIONAL; INTRAMUSCULAR; INTRAVENOUS; SOFT TISSUE
Status: DISCONTINUED | OUTPATIENT
Start: 2025-06-06 | End: 2025-06-06 | Stop reason: HOSPADM

## 2025-06-06 RX ORDER — ALBUTEROL SULFATE 0.83 MG/ML
2.5 SOLUTION RESPIRATORY (INHALATION) EVERY 4 HOURS PRN
Status: DISCONTINUED | OUTPATIENT
Start: 2025-06-06 | End: 2025-06-06 | Stop reason: HOSPADM

## 2025-06-06 RX ORDER — HYDROXYZINE HYDROCHLORIDE 10 MG/1
30-50 TABLET, FILM COATED ORAL EVERY 6 HOURS PRN
Status: DISCONTINUED | OUTPATIENT
Start: 2025-06-06 | End: 2025-06-10 | Stop reason: HOSPADM

## 2025-06-06 RX ORDER — HYDROMORPHONE HCL IN WATER/PF 6 MG/30 ML
0.2 PATIENT CONTROLLED ANALGESIA SYRINGE INTRAVENOUS EVERY 5 MIN PRN
Status: DISCONTINUED | OUTPATIENT
Start: 2025-06-06 | End: 2025-06-06 | Stop reason: HOSPADM

## 2025-06-06 RX ORDER — SODIUM CHLORIDE, SODIUM LACTATE, POTASSIUM CHLORIDE, CALCIUM CHLORIDE 600; 310; 30; 20 MG/100ML; MG/100ML; MG/100ML; MG/100ML
INJECTION, SOLUTION INTRAVENOUS CONTINUOUS
Status: DISCONTINUED | OUTPATIENT
Start: 2025-06-06 | End: 2025-06-07

## 2025-06-06 RX ORDER — ACETAMINOPHEN 325 MG/1
975 TABLET ORAL ONCE
Status: COMPLETED | OUTPATIENT
Start: 2025-06-06 | End: 2025-06-06

## 2025-06-06 RX ORDER — OXYCODONE HYDROCHLORIDE 5 MG/1
5-10 TABLET ORAL EVERY 4 HOURS PRN
Refills: 0 | Status: DISCONTINUED | OUTPATIENT
Start: 2025-06-06 | End: 2025-06-10 | Stop reason: HOSPADM

## 2025-06-06 RX ORDER — LIDOCAINE 40 MG/G
CREAM TOPICAL
Status: DISCONTINUED | OUTPATIENT
Start: 2025-06-06 | End: 2025-06-06 | Stop reason: HOSPADM

## 2025-06-06 RX ORDER — CEFAZOLIN SODIUM/WATER 2 G/20 ML
2 SYRINGE (ML) INTRAVENOUS SEE ADMIN INSTRUCTIONS
Status: DISCONTINUED | OUTPATIENT
Start: 2025-06-06 | End: 2025-06-06 | Stop reason: HOSPADM

## 2025-06-06 RX ORDER — FENTANYL CITRATE 50 UG/ML
50 INJECTION, SOLUTION INTRAMUSCULAR; INTRAVENOUS EVERY 5 MIN PRN
Status: DISCONTINUED | OUTPATIENT
Start: 2025-06-06 | End: 2025-06-06 | Stop reason: HOSPADM

## 2025-06-06 RX ORDER — HYDRALAZINE HYDROCHLORIDE 20 MG/ML
10 INJECTION INTRAMUSCULAR; INTRAVENOUS EVERY 30 MIN PRN
Status: DISCONTINUED | OUTPATIENT
Start: 2025-06-06 | End: 2025-06-10 | Stop reason: HOSPADM

## 2025-06-06 RX ORDER — METHOCARBAMOL 750 MG/1
750 TABLET, FILM COATED ORAL 3 TIMES DAILY
Status: DISCONTINUED | OUTPATIENT
Start: 2025-06-06 | End: 2025-06-10 | Stop reason: HOSPADM

## 2025-06-06 RX ORDER — HYDROMORPHONE HCL IN WATER/PF 6 MG/30 ML
0.4 PATIENT CONTROLLED ANALGESIA SYRINGE INTRAVENOUS EVERY 5 MIN PRN
Status: DISCONTINUED | OUTPATIENT
Start: 2025-06-06 | End: 2025-06-06 | Stop reason: HOSPADM

## 2025-06-06 RX ORDER — FENTANYL CITRATE 50 UG/ML
25 INJECTION, SOLUTION INTRAMUSCULAR; INTRAVENOUS EVERY 5 MIN PRN
Status: DISCONTINUED | OUTPATIENT
Start: 2025-06-06 | End: 2025-06-06 | Stop reason: HOSPADM

## 2025-06-06 RX ORDER — ONDANSETRON 4 MG/1
4 TABLET, ORALLY DISINTEGRATING ORAL EVERY 30 MIN PRN
Status: DISCONTINUED | OUTPATIENT
Start: 2025-06-06 | End: 2025-06-06 | Stop reason: HOSPADM

## 2025-06-06 RX ORDER — GABAPENTIN 300 MG/1
300 CAPSULE ORAL 3 TIMES DAILY
Status: DISCONTINUED | OUTPATIENT
Start: 2025-06-06 | End: 2025-06-10 | Stop reason: HOSPADM

## 2025-06-06 RX ORDER — LIDOCAINE 4 G/G
1 PATCH TOPICAL
Status: DISCONTINUED | OUTPATIENT
Start: 2025-06-06 | End: 2025-06-10 | Stop reason: HOSPADM

## 2025-06-06 RX ORDER — KETOROLAC TROMETHAMINE 30 MG/ML
15 INJECTION, SOLUTION INTRAMUSCULAR; INTRAVENOUS EVERY 6 HOURS
Status: DISCONTINUED | OUTPATIENT
Start: 2025-06-06 | End: 2025-06-08

## 2025-06-06 RX ADMIN — OXYCODONE HYDROCHLORIDE 5 MG: 5 TABLET ORAL at 15:51

## 2025-06-06 RX ADMIN — SODIUM CHLORIDE, POTASSIUM CHLORIDE, SODIUM LACTATE AND CALCIUM CHLORIDE: 600; 310; 30; 20 INJECTION, SOLUTION INTRAVENOUS at 17:20

## 2025-06-06 RX ADMIN — GABAPENTIN 300 MG: 300 CAPSULE ORAL at 20:05

## 2025-06-06 RX ADMIN — ACETAMINOPHEN 975 MG: 325 TABLET, FILM COATED ORAL at 15:43

## 2025-06-06 RX ADMIN — SODIUM CHLORIDE, POTASSIUM CHLORIDE, SODIUM LACTATE AND CALCIUM CHLORIDE: 600; 310; 30; 20 INJECTION, SOLUTION INTRAVENOUS at 22:22

## 2025-06-06 RX ADMIN — ACETAMINOPHEN 975 MG: 325 TABLET, FILM COATED ORAL at 23:45

## 2025-06-06 RX ADMIN — LIDOCAINE 1 PATCH: 560 PATCH PERCUTANEOUS; TOPICAL; TRANSDERMAL at 20:05

## 2025-06-06 RX ADMIN — FENTANYL CITRATE 50 MCG: 50 INJECTION INTRAMUSCULAR; INTRAVENOUS at 14:11

## 2025-06-06 RX ADMIN — KETOROLAC TROMETHAMINE 15 MG: 30 INJECTION, SOLUTION INTRAMUSCULAR at 17:26

## 2025-06-06 RX ADMIN — KETOROLAC TROMETHAMINE 15 MG: 30 INJECTION, SOLUTION INTRAMUSCULAR at 23:45

## 2025-06-06 RX ADMIN — ACETAMINOPHEN 975 MG: 325 TABLET ORAL at 06:36

## 2025-06-06 RX ADMIN — METHOCARBAMOL 750 MG: 750 TABLET ORAL at 20:05

## 2025-06-06 ASSESSMENT — ACTIVITIES OF DAILY LIVING (ADL)
ADLS_ACUITY_SCORE: 22
ADLS_ACUITY_SCORE: 23
ADLS_ACUITY_SCORE: 22
ADLS_ACUITY_SCORE: 22
ADLS_ACUITY_SCORE: 23
ADLS_ACUITY_SCORE: 18
ADLS_ACUITY_SCORE: 22
ADLS_ACUITY_SCORE: 22
ADLS_ACUITY_SCORE: 18
ADLS_ACUITY_SCORE: 23
ADLS_ACUITY_SCORE: 22

## 2025-06-06 NOTE — PROGRESS NOTES
Thoracic Surgery Progress Note  Surgery Cross-Cover  Post Op Check    06/06/2025    Riccardo Urias is a 25 year old male POD#0 s/p Procedure(s):  Modified Ravitch repair with retrosternal bar placement and sternal plating. Intercostal nerve cryoablation. for Pre-Op Diagnosis Codes:      * Pectus excavatum [Q67.6]    Pt reports their pain is controlled with current regimen. Denies nausea, SOB, chest pain, or dizziness. Patient is not passing flatus or having bowel movements and has not yet voided.     /79 (BP Location: Left arm)   Pulse 81   Temp 97.7  F (36.5  C) (Oral)   Resp 18   Ht 1.829 m (6')   Wt 61.3 kg (135 lb 2.3 oz)   SpO2 95%   BMI 18.33 kg/m      Gen: A&O x4, NAD   Chest: breathing non-labored on RA, mild but appropriate TTP  Abdomen: soft, non-tender, non-distended  Incisions C/D/I and presents no sign of acute infection including erythema, underlying induration or fluctuance, abnormal bruising, purulence, or other drainage.     Extremities: warm and well perfused  Devices: LUQ/Chest JESENIA, bloody     A/P: No acute post-op issues. Continue plan of care per primary team. Please call with any questions.    Raul Gonzalez, DO

## 2025-06-06 NOTE — PROGRESS NOTES
"Subjective   Patient ID: Ashley Florian is a 22 y.o. female who presents for URI.    States she was here over 2 weeks ago with sore throat and cough. Took mucinex DM with no relief. States the symptoms resolved but returned 1 week ago and progressively worsening.    URI   This is a recurrent problem. The current episode started in the past 7 days. The problem has been gradually worsening. There has been no fever. Associated symptoms include coughing, headaches, rhinorrhea and a sore throat. Pertinent negatives include no abdominal pain, chest pain, congestion, ear pain, neck pain, sinus pain, sneezing or wheezing. She has tried decongestant for the symptoms. The treatment provided no relief.        Review of Systems   Constitutional:  Positive for fever. Negative for activity change, appetite change, chills and fatigue.   HENT:  Positive for postnasal drip, rhinorrhea, sinus pressure and sore throat. Negative for congestion, ear discharge, ear pain, sinus pain, sneezing and tinnitus.    Eyes:  Negative for discharge and itching.   Respiratory:  Positive for cough. Negative for apnea, shortness of breath and wheezing.    Cardiovascular:  Negative for chest pain and palpitations.   Gastrointestinal:  Negative for abdominal pain.   Genitourinary:  Negative for difficulty urinating.   Musculoskeletal:  Negative for arthralgias, joint swelling, neck pain and neck stiffness.   Skin:  Negative for color change.   Allergic/Immunologic: Negative for environmental allergies.   Neurological:  Positive for headaches.   Psychiatric/Behavioral:  Negative for agitation.        Objective   /68   Pulse (!) 114   Temp 36.9 °C (98.5 °F)   Ht 1.6 m (5' 3\")   Wt 69.9 kg (154 lb)   SpO2 97%   BMI 27.28 kg/m²     Physical Exam  Vitals reviewed.   HENT:      Head: Normocephalic.      Ears:      Comments: Mild bilateral swelling of TM. Absent of effusions or redness.      Nose: Congestion and rhinorrhea present.      " Patient arrived from PACU via liter transfer. A/o x 4 and cooperative with cares. Gave education on sternal precautions and use of I/S with splinting for coughing. AVSS with sat's 96% on room air. Sternal with old drainage marked and JESENIA in place. No void to present and bladder scan was 309 ml. Riccardo thinks her can void soon. Parents in room for support.   Comments: Maxillary sinus pressure and pain upon palpation.     Mouth/Throat:      Mouth: Mucous membranes are moist.   Eyes:      Conjunctiva/sclera: Conjunctivae normal.   Cardiovascular:      Rate and Rhythm: Normal rate and regular rhythm.      Pulses: Normal pulses.      Heart sounds: Normal heart sounds.   Pulmonary:      Effort: Pulmonary effort is normal.      Breath sounds: Normal breath sounds.   Musculoskeletal:      Cervical back: Neck supple. No rigidity.   Lymphadenopathy:      Cervical: No cervical adenopathy.   Skin:     General: Skin is warm and dry.      Capillary Refill: Capillary refill takes less than 2 seconds.   Neurological:      General: No focal deficit present.      Mental Status: She is alert. Mental status is at baseline.   Psychiatric:         Mood and Affect: Mood normal.         Judgment: Judgment normal.         Assessment/Plan   Problem List Items Addressed This Visit    None  Visit Diagnoses         Codes    Acute non-recurrent maxillary sinusitis    -  Primary J01.00    Relevant Medications    amoxicillin-pot clavulanate (Augmentin) 875-125 mg tablet    Mild intermittent asthma with exacerbation     J45.21    Relevant Medications    predniSONE (Deltasone) 20 mg tablet          Discussed diagnosis, treatment and anticipated course of illness with the patient who verbalized understanding. Instructed to increase PO fluid intake and obtain 8 hours of rest.  Instructed to take medications as prescribed. Follow up with primary care provider in the event signs and symptoms worsen or fail to improve with treatment plan.

## 2025-06-06 NOTE — PHARMACY-ADMISSION MEDICATION HISTORY
Pharmacy Intern Admission Medication History    Admission medication history is complete. The information provided in this note is only as accurate as the sources available at the time of the update.    Information Source(s): Patient and Family member via in-person    Pertinent Information:   Upon my visit, both the patient and his parents confirmed he takes just one medication at home, which is fluoxetine.     Changes made to PTA medication list:  Added: None  Deleted: None  Changed: None    Allergies reviewed with patient and updates made in EHR: yes    Medication History Completed By: Wendy Talbot 6/6/2025 5:54 PM    PTA Med List   Medication Sig Last Dose/Taking    FLUoxetine (PROZAC) 20 MG capsule Take 20 mg by mouth every morning. 6/5/2025

## 2025-06-06 NOTE — OP NOTE
OPERATIVE REPORT    PREOP DIAGNOSIS: Severe Pectus Excavatum     POSTOP DIAGNOSIS: Same     PROCEDURE PERFORMED:    Modified Ravitch repair with retrosternal bar placement and sternal plating   Intercostal nerve cryoablation (2, 3, 4 bilateral)    SURGEON: Kristi Gibson MD    ASSISTANT: Jerad Gonzalez MD    ANESTHESIA: General     COMPLICATIONS: None      EBL: 20 ML    DRAINS: 19 Fr remy drain placed under pectoralis major muscle.     SPECIMENS; None     IMPLANTS: KLS Vidal sternal plating system. We used one plate and 6 screws above and 4 screws below the sternotomy. We placed a 22 cm retrosternal bar.     PROCEDURE IN DETAIL;   The patient was taken to the OR and laid supine. General anesthesia was induced. A time out was performed confirming the name of the patient and correct procedure. SCDs were in place and working prior to induction of anesthesia. The chest was prepped with Chloraprep and draped in sterile fashion.     We made a bell shape incision across the chest and raised myocutaneous flaps superiorly to the level of the angle of Catrachito, and inferiorly to the xiphoid process. We removed the xiphoid process.     We excised 1 cm of each cartilage of ribs 3-5 bilaterally, at the costosternal junction in a subperichondrial plane.     A transverse wedge sternotomy was made at the 2nd intercostal space with an oscillating saw. We then created a retrosternal tunnel with the surgeons finger and passed a 32 Fr chest tube behind the sternum. A 22 cm retrosternal bar was brought to the field and tailored to the patients anatomy. One end of the bar was introduced into the chest tube and secured with an 0-silk stitch. The chest tube was pulled from one side sliding the bar behind the sternum. The chest tube was removed and both ends of the bar were secured to the chest wall with 2-0 PDS pericostal stitches.     Sternal plating: We used the KLS Vidal sternal plating system. A H-shape sternal plate was brought  to the field and laid on top of the sternum.  We used one plate which was secured with screws on either side of the transverse sternotomy.     Intercostal nerve cryoablation: We performed cryoablation of the intercostal nerves, spaces 2, 3 and 4 bilaterally.     The result was excellent. We confirmed hemostasis and placed a 19 Fr remy drain underneath the pectoralis major muscle. We used 2-0 vicryl to re approximate the pectoralis muscle and to tack it to the chest wall. We closed the skin in layers with absorbable suture. We applied Exxofin to the skin incision.     The patient tolerated the procedure well, all instruments and sponge counts were correct.     The patient was extubated and transferred to PACU for recovery.     Kristi Gibson MD

## 2025-06-07 ENCOUNTER — APPOINTMENT (OUTPATIENT)
Dept: GENERAL RADIOLOGY | Facility: CLINIC | Age: 25
DRG: 516 | End: 2025-06-07
Payer: MEDICARE

## 2025-06-07 ENCOUNTER — APPOINTMENT (OUTPATIENT)
Dept: OCCUPATIONAL THERAPY | Facility: CLINIC | Age: 25
DRG: 516 | End: 2025-06-07
Payer: MEDICARE

## 2025-06-07 LAB
GLUCOSE BLDC GLUCOMTR-MCNC: 111 MG/DL (ref 70–99)
GLUCOSE BLDC GLUCOMTR-MCNC: 111 MG/DL (ref 70–99)
GLUCOSE BLDC GLUCOMTR-MCNC: 96 MG/DL (ref 70–99)

## 2025-06-07 PROCEDURE — 71045 X-RAY EXAM CHEST 1 VIEW: CPT | Mod: 26 | Performed by: RADIOLOGY

## 2025-06-07 PROCEDURE — 250N000011 HC RX IP 250 OP 636: Mod: JW

## 2025-06-07 PROCEDURE — 97530 THERAPEUTIC ACTIVITIES: CPT | Mod: GO

## 2025-06-07 PROCEDURE — 250N000013 HC RX MED GY IP 250 OP 250 PS 637

## 2025-06-07 PROCEDURE — 97165 OT EVAL LOW COMPLEX 30 MIN: CPT | Mod: GO

## 2025-06-07 PROCEDURE — 71045 X-RAY EXAM CHEST 1 VIEW: CPT

## 2025-06-07 PROCEDURE — 258N000003 HC RX IP 258 OP 636

## 2025-06-07 PROCEDURE — 120N000005 HC R&B MS OVERFLOW UMMC

## 2025-06-07 PROCEDURE — 250N000011 HC RX IP 250 OP 636

## 2025-06-07 RX ORDER — METHOCARBAMOL 750 MG/1
750 TABLET, FILM COATED ORAL 3 TIMES DAILY
Qty: 30 TABLET | Refills: 0 | Status: SHIPPED | OUTPATIENT
Start: 2025-06-07

## 2025-06-07 RX ORDER — SODIUM CHLORIDE, SODIUM LACTATE, POTASSIUM CHLORIDE, CALCIUM CHLORIDE 600; 310; 30; 20 MG/100ML; MG/100ML; MG/100ML; MG/100ML
INJECTION, SOLUTION INTRAVENOUS CONTINUOUS
Status: DISCONTINUED | OUTPATIENT
Start: 2025-06-07 | End: 2025-06-08

## 2025-06-07 RX ORDER — NALOXONE HYDROCHLORIDE 0.4 MG/ML
0.4 INJECTION, SOLUTION INTRAMUSCULAR; INTRAVENOUS; SUBCUTANEOUS
Status: DISCONTINUED | OUTPATIENT
Start: 2025-06-07 | End: 2025-06-10 | Stop reason: HOSPADM

## 2025-06-07 RX ORDER — NALOXONE HYDROCHLORIDE 0.4 MG/ML
0.2 INJECTION, SOLUTION INTRAMUSCULAR; INTRAVENOUS; SUBCUTANEOUS
Status: DISCONTINUED | OUTPATIENT
Start: 2025-06-07 | End: 2025-06-10 | Stop reason: HOSPADM

## 2025-06-07 RX ORDER — KETOROLAC TROMETHAMINE 10 MG/1
20 TABLET, FILM COATED ORAL EVERY 6 HOURS PRN
Qty: 20 TABLET | Refills: 0 | Status: SHIPPED | OUTPATIENT
Start: 2025-06-07 | End: 2025-06-07

## 2025-06-07 RX ORDER — SENNOSIDES 8.6 MG
8.6 TABLET ORAL 2 TIMES DAILY PRN
Status: DISCONTINUED | OUTPATIENT
Start: 2025-06-07 | End: 2025-06-10 | Stop reason: HOSPADM

## 2025-06-07 RX ORDER — CYCLOBENZAPRINE HCL 10 MG
10 TABLET ORAL 3 TIMES DAILY PRN
Qty: 30 TABLET | Refills: 0 | Status: SHIPPED | OUTPATIENT
Start: 2025-06-07

## 2025-06-07 RX ORDER — POLYETHYLENE GLYCOL 3350 17 G/17G
17 POWDER, FOR SOLUTION ORAL DAILY
Status: DISCONTINUED | OUTPATIENT
Start: 2025-06-07 | End: 2025-06-10 | Stop reason: HOSPADM

## 2025-06-07 RX ORDER — LIDOCAINE 4 G/G
1 PATCH TOPICAL EVERY 24 HOURS
Qty: 10 PATCH | Refills: 0 | Status: SHIPPED | OUTPATIENT
Start: 2025-06-07 | End: 2025-06-10

## 2025-06-07 RX ORDER — OXYCODONE HYDROCHLORIDE 5 MG/1
5 TABLET ORAL EVERY 4 HOURS PRN
Qty: 10 TABLET | Refills: 0 | Status: SHIPPED | OUTPATIENT
Start: 2025-06-07 | End: 2025-06-10

## 2025-06-07 RX ORDER — POLYETHYLENE GLYCOL 3350 17 G/17G
1 POWDER, FOR SOLUTION ORAL 2 TIMES DAILY
Qty: 510 G | Refills: 0 | Status: SHIPPED | OUTPATIENT
Start: 2025-06-07

## 2025-06-07 RX ORDER — GABAPENTIN 300 MG/1
300 CAPSULE ORAL 3 TIMES DAILY
Qty: 100 CAPSULE | Refills: 0 | Status: ACTIVE | OUTPATIENT
Start: 2025-06-07 | End: 2025-06-10

## 2025-06-07 RX ORDER — ACETAMINOPHEN 325 MG/1
975 TABLET ORAL EVERY 8 HOURS
Qty: 300 TABLET | Refills: 0 | Status: SHIPPED | OUTPATIENT
Start: 2025-06-07 | End: 2025-06-10

## 2025-06-07 RX ORDER — IBUPROFEN 600 MG/1
600 TABLET, FILM COATED ORAL EVERY 6 HOURS PRN
Qty: 30 TABLET | Refills: 0 | Status: SHIPPED | OUTPATIENT
Start: 2025-06-07

## 2025-06-07 RX ORDER — ONDANSETRON 2 MG/ML
4 INJECTION INTRAMUSCULAR; INTRAVENOUS EVERY 6 HOURS PRN
Status: DISCONTINUED | OUTPATIENT
Start: 2025-06-07 | End: 2025-06-10 | Stop reason: HOSPADM

## 2025-06-07 RX ORDER — SENNOSIDES 8.6 MG
1 TABLET ORAL 2 TIMES DAILY
Qty: 60 TABLET | Refills: 0 | Status: SHIPPED | OUTPATIENT
Start: 2025-06-07

## 2025-06-07 RX ORDER — ONDANSETRON 4 MG/1
4 TABLET, ORALLY DISINTEGRATING ORAL EVERY 6 HOURS PRN
Status: DISCONTINUED | OUTPATIENT
Start: 2025-06-07 | End: 2025-06-10 | Stop reason: HOSPADM

## 2025-06-07 RX ADMIN — ACETAMINOPHEN 975 MG: 325 TABLET, FILM COATED ORAL at 23:23

## 2025-06-07 RX ADMIN — KETOROLAC TROMETHAMINE 15 MG: 30 INJECTION, SOLUTION INTRAMUSCULAR at 13:19

## 2025-06-07 RX ADMIN — METHOCARBAMOL 750 MG: 750 TABLET ORAL at 13:19

## 2025-06-07 RX ADMIN — OXYCODONE HYDROCHLORIDE 5 MG: 5 TABLET ORAL at 17:59

## 2025-06-07 RX ADMIN — HYDROMORPHONE HYDROCHLORIDE 0.4 MG: 1 INJECTION, SOLUTION INTRAMUSCULAR; INTRAVENOUS; SUBCUTANEOUS at 19:58

## 2025-06-07 RX ADMIN — OXYCODONE HYDROCHLORIDE 10 MG: 5 TABLET ORAL at 22:06

## 2025-06-07 RX ADMIN — METHOCARBAMOL 750 MG: 750 TABLET ORAL at 19:57

## 2025-06-07 RX ADMIN — GABAPENTIN 300 MG: 300 CAPSULE ORAL at 19:57

## 2025-06-07 RX ADMIN — SODIUM CHLORIDE, SODIUM LACTATE, POTASSIUM CHLORIDE, AND CALCIUM CHLORIDE: .6; .31; .03; .02 INJECTION, SOLUTION INTRAVENOUS at 20:05

## 2025-06-07 RX ADMIN — KETOROLAC TROMETHAMINE 15 MG: 30 INJECTION, SOLUTION INTRAMUSCULAR at 17:49

## 2025-06-07 RX ADMIN — HYDROMORPHONE HYDROCHLORIDE 0.4 MG: 1 INJECTION, SOLUTION INTRAMUSCULAR; INTRAVENOUS; SUBCUTANEOUS at 23:23

## 2025-06-07 RX ADMIN — POLYETHYLENE GLYCOL 3350 17 G: 17 POWDER, FOR SOLUTION ORAL at 17:11

## 2025-06-07 RX ADMIN — LIDOCAINE 1 PATCH: 560 PATCH PERCUTANEOUS; TOPICAL; TRANSDERMAL at 19:58

## 2025-06-07 RX ADMIN — ACETAMINOPHEN 975 MG: 325 TABLET, FILM COATED ORAL at 16:02

## 2025-06-07 RX ADMIN — SODIUM CHLORIDE, SODIUM LACTATE, POTASSIUM CHLORIDE, AND CALCIUM CHLORIDE 500 ML: .6; .31; .03; .02 INJECTION, SOLUTION INTRAVENOUS at 12:25

## 2025-06-07 RX ADMIN — GABAPENTIN 300 MG: 300 CAPSULE ORAL at 07:50

## 2025-06-07 RX ADMIN — ACETAMINOPHEN 975 MG: 325 TABLET, FILM COATED ORAL at 07:50

## 2025-06-07 RX ADMIN — SODIUM CHLORIDE, SODIUM LACTATE, POTASSIUM CHLORIDE, AND CALCIUM CHLORIDE: .6; .31; .03; .02 INJECTION, SOLUTION INTRAVENOUS at 14:48

## 2025-06-07 RX ADMIN — ONDANSETRON 4 MG: 2 INJECTION, SOLUTION INTRAMUSCULAR; INTRAVENOUS at 21:39

## 2025-06-07 RX ADMIN — KETOROLAC TROMETHAMINE 15 MG: 30 INJECTION, SOLUTION INTRAMUSCULAR at 06:19

## 2025-06-07 RX ADMIN — METHOCARBAMOL 750 MG: 750 TABLET ORAL at 07:50

## 2025-06-07 RX ADMIN — GABAPENTIN 300 MG: 300 CAPSULE ORAL at 13:18

## 2025-06-07 ASSESSMENT — ACTIVITIES OF DAILY LIVING (ADL)
ADLS_ACUITY_SCORE: 34
ADLS_ACUITY_SCORE: 23
ADLS_ACUITY_SCORE: 23
ADLS_ACUITY_SCORE: 43
ADLS_ACUITY_SCORE: 23
ADLS_ACUITY_SCORE: 43
ADLS_ACUITY_SCORE: 23
ADLS_ACUITY_SCORE: 34
ADLS_ACUITY_SCORE: 34
ADLS_ACUITY_SCORE: 23
ADLS_ACUITY_SCORE: 34
ADLS_ACUITY_SCORE: 43
ADLS_ACUITY_SCORE: 34
ADLS_ACUITY_SCORE: 23
IADL_COMMENTS: IND AT BASELINE
ADLS_ACUITY_SCORE: 34
ADLS_ACUITY_SCORE: 34

## 2025-06-07 NOTE — PLAN OF CARE
[id number]    No acute changes overnight. Pt seems to have a hard time calling for pain medication and/or verbalizing needs. Pt is very pleasant but did not let me know when he had pulled his JESENIA while asleep, which lead to bleeding through the JESNEIA dressing and gown. MD notified, site is still sutured in place and no active bleeding. VSS. NAD.     Goal Outcome Evaluation:      Plan of Care Reviewed With: patient, parent    Overall Patient Progress: improvingOverall Patient Progress: improving

## 2025-06-07 NOTE — PROGRESS NOTES
Cross-cover note: 11:46 AM 6/7/2025 06/07/2025    General Surgery Cross Cover Note    Called by nursing regarding patient had a fall.     Per patient he was up urinating when he got dizzy. He was helped down to the floor. Patient now lying comfortably in bed. Denies any dizziness, pain, dyspnea, chest pain. He just feels tired.  Parents by the bedside.     B/P: 105/73, T: 97.5, P: 101, R: 16    PE:  A&O x3, NAD  Breathing non-labored  Abd soft, non-tender, non-distended  Extr. Warm to touch  Incisions clean, dry, intact    Plan:  - 500ml LR bolus  - orthostatic x1    Thoracic surgery fellow notified.     DEANDRE Augustin,   General Surgery PGY-1

## 2025-06-07 NOTE — DISCHARGE SUMMARY
Thoracic Surgery Discharge Summary    NAME: Riccardo Urias   MRN: [id number]   : 2000     DATE OF ADMISSION: 2025     PRE/POSTOPERATIVE DIAGNOSES: pectus excavatum    PROCEDURES PERFORMED: Modified ravitch    PATHOLOGY RESULTS: N/A     CULTURE RESULTS: N/A    INTRAOPERATIVE COMPLICATIONS: None     POSTOPERATIVE COMPLICATIONS: None     DRAINS/TUBES PRESENT AT DISCHARGE: sutures    DATE OF DISCHARGE:  25     HOSPITAL COURSE: Riccardo Urias is a 25 year old male who on 2025  underwent the above-named procedures.  Hhe tolerated the operation well and postoperatively was transferred to the general post-surgical unit.  The remainder of his course was essentially uncomplicated.  Prior to discharge, his pain was controlled well, he was able to perform ADLs and ambulate independently without difficulty, and had full return of bowel and bladder function.  On 25 , he was discharged to home in stable condition.    DISCHARGE INSTRUCTIONS:  THORACIC SURGERY DISCHARGE INSTRUCTIONS    DIET: Regular diet as tolerated    If your plans upon discharge include prolonged periods of sitting (i.e a lengthy car or plane ride), it is highly beneficial to get up and walk at least once per hour to help prevent swelling and blood clots.     You will discharge with a drain. Replace drain sponge dressing as needed. Record amounts daily and report to clinic with daily amounts. Once less than 30cc for 2+ days, call clinic. This will be removed in clinic, but may stay in for 1+ week(s).    Remove chest wall dressing POD 2 .     You may get incision and tube wet 2 days after operation. Do not submerge, soak, or scrub incision or swim until seen in follow-up.    Take incentive spirometer home for continued frequent use    Sternal precautions for 3 months -   ? No lifting > 5-10 lbs (e.g., gallon of milk)    ? No pushing or pulling with arms (e.g., door, walker)    ? No reaching behind the back or overhead with both arms  "simultaneously    ? No using arms to push off bed/chair -- use legs or rolling technique    ? Hold a pillow to chest (\"heart hugger\") when coughing/sneezing    ? Use log-rolling technique to get out of bed    ? Encourage walking for circulation and recovery -- without arm assistance      Stay hydrated. Take over the counter fiber (metamucil or benefiber) and stool softeners (Miralax, docusate or senna) if becoming constipated.     Call for fever greater than 101.5, chills, increased size of incision, red skin around incision, vision changes, muscle strength changes, sensation changes, shortness of breath, or other concerns.    No driving while taking narcotic pain medication.    Transition to ibuprofen or tylenol/acetaminophen for pain control. Do not take tylenol/acetaminophen and acetaminophen containing narcotic (e.g., percocet or vicodin) at the same time. If you have known ulcer problems, or kidney trouble (elevated creatinine) do not take the ibuprofen.    In emergencies, call 911    For other Questions or Concerns;   A.) During weekday working hours (Monday through Friday 8am to 4:30pm)   call [phone] and ask to speak to a clinical nurse specialist.     B.) At nights (after 4:30pm), on weekends, or if urgent call [phone] and   tell the   I would like to page job code [phone], the thoracic surgery   fellow on call, please.         FOLLOW UP APPOINTMENTS:   7/9/25 with Dr Pardo    DISCHARGE MEDICATIONS:      Review of your medicines         Important    This medication list is not yet final, because your doctor or pharmacist is still double-checking some of the changes. Ask your nurse for an updated version.  Specifically ask about this and similar medications: oxyCODONE (ROXICODONE) 5 MG tablet       START taking        Dose / Directions   acetaminophen 325 MG tablet  Commonly known as: TYLENOL      Dose: 975 mg  Take 3 tablets (975 mg) by mouth every 8 hours.  Quantity: 300 " tablet  Refills: 0     gabapentin 300 MG capsule  Commonly known as: NEURONTIN      Dose: 300 mg  Take 1 capsule (300 mg) by mouth 3 times daily.  Quantity: 100 capsule  Refills: 0     ketorolac 10 MG tablet  Commonly known as: TORADOL      Dose: 20 mg  Take 2 tablets (20 mg) by mouth every 6 hours as needed for moderate pain.  Quantity: 20 tablet  Refills: 0     Lidocaine 4 % Patch  Commonly known as: LIDOCARE      Dose: 1 patch  Place 1 patch over 12 hours onto the skin every 24 hours. To prevent lidocaine toxicity, patient should be patch free for 12 hrs daily.  Quantity: 10 patch  Refills: 0     magnesium hydroxide 400 MG/5ML suspension  Commonly known as: MILK OF MAGNESIA      Dose: 15 mL  Take 15 mLs by mouth daily as needed for constipation or heartburn.  Quantity: 354 mL  Refills: 0     methocarbamol 750 MG tablet  Commonly known as: ROBAXIN      Dose: 750 mg  Take 1 tablet (750 mg) by mouth 3 times daily.  Quantity: 30 tablet  Refills: 0     oxyCODONE 5 MG tablet  Commonly known as: ROXICODONE      Dose: 5 mg  Take 1 tablet (5 mg) by mouth every 4 hours as needed.  Quantity: 10 tablet  Refills: 0     polyethylene glycol 17 GM/Dose powder  Commonly known as: MIRALAX      Dose: 1 Capful  Take 17 g (1 Capful) by mouth 2 times daily.  Quantity: 510 g  Refills: 0     sennosides 8.6 MG tablet  Commonly known as: SENOKOT      Dose: 1 tablet  Take 1 tablet by mouth 2 times daily.  Quantity: 60 tablet  Refills: 0            CONTINUE these medicines which have NOT CHANGED        Dose / Directions   FLUoxetine 20 MG capsule  Commonly known as: PROzac      Dose: 20 mg  Take 20 mg by mouth every morning.  Refills: 0               Where to get your medicines        These medications were sent to [facility] - [place], [place] - [address]  [address], [facility] [address]      Phone: [phone]   acetaminophen 325 MG tablet  gabapentin 300 MG capsule  ketorolac 10 MG tablet  Lidocaine  4 % Patch  magnesium hydroxide 400 MG/5ML suspension  methocarbamol 750 MG tablet  polyethylene glycol 17 GM/Dose powder  sennosides 8.6 MG tablet       Information about where to get these medications is not yet available    Ask your nurse or doctor about these medications  oxyCODONE 5 MG tablet           *MEDICATIONS INTENDED TO BE THE SAME AS PRIOR TO ADMISSION    Pt seen and discussed with fellow and/or chief resident and staff surgeon.     DO RO EspitiaA  [facility]  PGY-1 Resident, General Surgery

## 2025-06-07 NOTE — PLAN OF CARE
Goal Outcome Evaluation:      Plan of Care Reviewed With: patient, parent    Overall Patient Progress: no changeOverall Patient Progress: no change    Outcome Evaluation: working on pain management; encouraging fluids; miralax given      /67   Pulse 106   Temp 98.1  F (36.7  C) (Oral)   Resp 16   Ht 1.829 m (6')   Wt 61.3 kg (135 lb 2.3 oz)   SpO2 98%   BMI 18.33 kg/m      Assumed cares [id number]  Neuro: A/Ox4, flat affect  Pain/Nausea: pt was confused about pain scale; please use FACES; given scheduled meds and Oxycodone x1  Cardiac: rate and rhythm regular  Resp: lung sounds clear, equal bilaterally  GI/: voiding spontaneously; no BM since surgery; pt reports passing a little gas; Miralax given  Diet/Appetite: Regular diet; pt ate a good dinner  Skin: sternal incision - TAHMINA  Access: PIV - LR @75; PIV saline locked  Activity: pt educated to only stand with staff present d/t syncopal episode earlier today  Plan:   Will continue with plan of care and notify team of any changes.?    WAQAR STOKES, RN on 6/7/2025 at 6:46 PM

## 2025-06-07 NOTE — PROGRESS NOTES
"   06/07/25 0900   Appointment Info   Signing Clinician's Name / Credentials (OT) Avis Braswell OT/L   Rehab Comments (OT) sternal   Living Environment   People in Home parent(s)   Current Living Arrangements house   Home Accessibility stairs within home   Number of Stairs, Within Home, Primary greater than 10 stairs  (15 steps to bedroom)   Stair Railings, Within Home, Primary none;other (see comments)  (Pt's parents stated they will put railings in once d/c, otherwise pt can stay on main level)   Transportation Anticipated family or friend will provide   Living Environment Comments Pt lives in a house w/parents and 2 dogs. Bedroom upstairs, uses walk-in shower, and no DME at baseline.   Self-Care   Usual Activity Tolerance good   Current Activity Tolerance moderate   Regular Exercise No   Equipment Currently Used at Home none   Fall history within last six months no   Activity/Exercise/Self-Care Comment IND at baseline   Instrumental Activities of Daily Living (IADL)   IADL Comments IND at baseline   General Information   Onset of Illness/Injury or Date of Surgery 06/06/25   Referring Physician Raul Gonzalez,    Patient/Family Therapy Goal Statement (OT) \"To go home\"   Additional Occupational Profile Info/Pertinent History of Current Problem Riccardo Urias is a 25 year old male  s/p modified ravitch on 6/6 w/ Dr Pardo.   Existing Precautions/Restrictions sternal   Left Upper Extremity (Weight-bearing Status) partial weight-bearing (PWB)  (#10 lb striction)   Right Upper Extremity (Weight-bearing Status) partial weight-bearing (PWB)  (#10 lb restriction)   Left Lower Extremity (Weight-bearing Status) full weight-bearing (FWB)   Right Lower Extremity (Weight-bearing Status) full weight-bearing (FWB)   General Observations and Info Activity: up with assist   Cognitive Status Examination   Orientation Status orientation to person, place and time   Affect/Mental Status (Cognitive) WFL   Visual Perception   Visual " Impairment/Limitations corrective lenses full-time   Sensory   Sensory Quick Adds sensation intact   Pain Assessment   Patient Currently in Pain No   Posture   Posture not impaired   Range of Motion Comprehensive   General Range of Motion no range of motion deficits identified   Strength Comprehensive (MMT)   General Manual Muscle Testing (MMT) Assessment no strength deficits identified   Comment, General Manual Muscle Testing (MMT) Assessment 3   Clinical Impression   Criteria for Skilled Therapeutic Interventions Met (OT) Yes, treatment indicated   OT Diagnosis decreased IND w/ADLs and IADLs with newly post-op prec   OT Problem List-Impairments impacting ADL problems related to;pain;post-surgical precautions   Assessment of Occupational Performance 1-3 Performance Deficits   Identified Performance Deficits dressing, leisure, home mgmt   Planned Therapy Interventions (OT) ADL retraining;bed mobility training;transfer training;progressive activity/exercise   Clinical Decision Making Complexity (OT) problem focused assessment/low complexity   Risk & Benefits of therapy have been explained evaluation/treatment results reviewed;care plan/treatment goals reviewed;risks/benefits reviewed;patient   Clinical Impression Comments Pt to benefit from OT during IP stay to address above deficits to maximize IND in ADLs/IADLs   OT Total Evaluation Time   OT Eval, Low Complexity Minutes (28523) 4   OT Goals   Therapy Frequency (OT) Daily   OT Predicted Duration/Target Date for Goal Attainment 06/11/25   OT Goals Hygiene/Grooming;Upper Body Dressing;Lower Body Dressing;Bed Mobility;Toilet Transfer/Toileting;OT Goal 1   OT: Hygiene/Grooming supervision/stand-by assist;within precautions   OT: Upper Body Dressing Supervision/stand-by assist;within precautions   OT: Lower Body Dressing Supervision/stand-by assist;within precautions   OT: Bed Mobility Supervision/stand-by assist;supine to/from sitting;rolling;bridging;within  "precautions   OT: Toilet Transfer/Toileting Supervision/stand-by assist;toilet transfer;cleaning and garment management;within precautions   OT: Goal 1 Pt will demo competency in post-op prec 100% of opportunities prior to d/c   Therapeutic Activities   Therapeutic Activity Minutes (21325) 35   Symptoms noted during/after treatment dizziness;fatigue   Treatment Detail/Skilled Intervention OT eval completed, treatment indicated. Edu pt on OT role, POC, and post-op prec and implications w/ADLs and IADLs. Handout provided for greater retention, pt and pt's parents verbalized understanding to all edu. Facilitated increased IND w/func mob within prec. Completed log roll technique w/CGA and mod verbal cues for sequencing. Pt's parents provided totalA for donning shorts and new hospital gown. CGA STS from EOB, upon standing to pull shorts over hips, pt endorsed \"feeling sleepy\" and began to sit back onto EOB. Th asked if pt felt dizzy, pt reported, \"No, just sleepy.\" Th and pt's parents encouraged pt to rest and attempt again, hoping to increase arousal and alertness for cont participation w/therapy as pt's parents anticipate discharge home today. CGA STS from EOB, pt endorsed needing to use the bathroom. Th asked if pt wanted urinal or toilet, pt stated pt wanted to amb to bathroom to \"pee.\" Amb w/CGA using no AE, no LOB noted. Pt completed toileting, upon completion, pt closed eyes and began to fall to the ground. Pt's father and therapist slowly lowered pt to ground (head did not hit the ground), RN called immediately. RN approached bathroom w/stander to safely transfer pt back to bed. /60 taken once seated EOB. Pt and pt's parents denied further questions/concerns, stated perhaps it's just been too busy of a morning as pt saw tubes being pulled and pt does not like scene of blood. Parents and RN present in room upon th exit.   OT Discharge Planning   OT Plan OT: review prec, standing ADLs, trial stairs (screen " for PT needs)   OT Discharge Recommendation (DC Rec) home with assist;home with home care occupational therapy   OT Rationale for DC Rec Pt below baseline, limited by pain and post-op prec. Anticipate w/cont therapies pt can safely d/c home with parents to assist in heavier IADLs   Total Session Time   Timed Code Treatment Minutes 35   Total Session Time (sum of timed and untimed services) 39

## 2025-06-07 NOTE — PROGRESS NOTES
Cross-cover note: 4:03 PM 6/7/2025 06/07/2025    General Surgery Cross Cover Note      Per patient he feels a little better, he got a nap and IVF bolus finished. Orthostatic dropped from supine to standing >20mmHg and patient reports he was dizzy.     Discussed plan of care with patient and parents at bedside    B/P: 101/61, T: 98.3, P: 98, R: 16    PE:  A&O x3, NAD  Breathing non-labored  Abd soft, non-tender, non-distended  Left chest JESENIA site: hemostatic, pressure dressing changed and replaced with another pressure dressings.   Extr. Warm to touch  Incisions clean, dry, intact    Plan:  - cancel discharge order    Thoracic fellow notified.       DEANDRE Augustin, DO  General Surgery PGY-1

## 2025-06-07 NOTE — DISCHARGE INSTRUCTIONS
"THORACIC SURGERY DISCHARGE INSTRUCTIONS    DIET: Regular diet as tolerated    If your plans upon discharge include prolonged periods of sitting (i.e a lengthy car or plane ride), it is highly beneficial to get up and walk at least once per hour to help prevent swelling and blood clots.     Remove chest wall dressing POD 2, 6/8.     You may get incision wet 2 days after operation. Do not submerge, soak, or scrub incision or swim until seen in follow-up.    Take incentive spirometer home for continued frequent use    Sternal precautions for 3 months -   ? No lifting > 5-10 lbs (e.g., gallon of milk)    ? No pushing or pulling with arms (e.g., door, walker)    ? No reaching behind the back or overhead with both arms simultaneously    ? No using arms to push off bed/chair -- use legs or rolling technique    ? Hold a pillow to chest (\"heart hugger\") when coughing/sneezing    ? Use log-rolling technique to get out of bed    ? Encourage walking for circulation and recovery -- without arm assistance      Stay hydrated. Take over the counter fiber (metamucil or benefiber) and stool softeners (Miralax, docusate or senna) if becoming constipated.     Call for fever greater than 101.5, chills, increased size of incision, red skin around incision, vision changes, muscle strength changes, sensation changes, shortness of breath, or other concerns.    No driving while taking narcotic pain medication.    Transition to ibuprofen or tylenol/acetaminophen for pain control. Do not take tylenol/acetaminophen and acetaminophen containing narcotic (e.g., percocet or vicodin) at the same time. If you have known ulcer problems, or kidney trouble (elevated creatinine) do not take the ibuprofen.    In emergencies, call 911    For other Questions or Concerns;   A.) During weekday working hours (Monday through Friday 8am to 4:30pm)   call [phone] and ask to speak to a clinical nurse specialist.     B.) At nights (after 4:30pm), on " weekends, or if urgent call [phone] and   tell the   I would like to page job code [phone], the thoracic surgery   fellow on call, please.

## 2025-06-07 NOTE — PLAN OF CARE
Goal Outcome Evaluation:      Plan of Care Reviewed With: patient, parent    Overall Patient Progress: no changeOverall Patient Progress: no change    Outcome Evaluation: Patient discharge postponed until tomorrow d/t orthostatic hypotension and dizziness when standing and walking.    /61   Pulse 98   Temp 98.3  F (36.8  C) (Oral)   Resp 16   Ht 1.829 m (6')   Wt 61.3 kg (135 lb 2.3 oz)   SpO2 96%   BMI 18.33 kg/m      Shift: 5891-4135  Isolation Status: None  VS: Orthostatic hypotension today on RA, afebrile.  Neuro: Aox4, flat affect, quiet, pleasant.   BG: Daily in a.m.  Labs/Imaging: No labs drawn today.  Respiratory: WDL  Cardiac: WDL  Pain/Nausea: Pain managed with scheduled tylenol, gabapentin, robaxin, and toradol.  Diet: Regular diet, fair appetite.  LDA: R and L PIV  Infusion(s): MIVF LR @ 75/hr.  GI/: Void x2 today,  only 1  measured, no BM this shift.  Skin: Abdominal incision and JESENIA site with pressure dressing.  Mobility: Assist x1, use caution with orthostatic BP's.  Plan: Probable discharge tomorrow, continue plan of care.     Handoff given to following RN.

## 2025-06-08 ENCOUNTER — APPOINTMENT (OUTPATIENT)
Dept: GENERAL RADIOLOGY | Facility: CLINIC | Age: 25
DRG: 516 | End: 2025-06-08
Payer: MEDICARE

## 2025-06-08 ENCOUNTER — APPOINTMENT (OUTPATIENT)
Dept: GENERAL RADIOLOGY | Facility: CLINIC | Age: 25
DRG: 516 | End: 2025-06-08
Attending: THORACIC SURGERY (CARDIOTHORACIC VASCULAR SURGERY)
Payer: MEDICARE

## 2025-06-08 ENCOUNTER — APPOINTMENT (OUTPATIENT)
Dept: OCCUPATIONAL THERAPY | Facility: CLINIC | Age: 25
DRG: 516 | End: 2025-06-08
Attending: THORACIC SURGERY (CARDIOTHORACIC VASCULAR SURGERY)
Payer: MEDICARE

## 2025-06-08 LAB
ANION GAP SERPL CALCULATED.3IONS-SCNC: 8 MMOL/L (ref 7–15)
ANION GAP SERPL CALCULATED.3IONS-SCNC: 9 MMOL/L (ref 7–15)
BUN SERPL-MCNC: 6.5 MG/DL (ref 6–20)
BUN SERPL-MCNC: 6.8 MG/DL (ref 6–20)
CALCIUM SERPL-MCNC: 8.6 MG/DL (ref 8.8–10.4)
CALCIUM SERPL-MCNC: 8.6 MG/DL (ref 8.8–10.4)
CHLORIDE SERPL-SCNC: 103 MMOL/L (ref 98–107)
CHLORIDE SERPL-SCNC: 105 MMOL/L (ref 98–107)
CREAT SERPL-MCNC: 0.67 MG/DL (ref 0.67–1.17)
CREAT SERPL-MCNC: 0.85 MG/DL (ref 0.67–1.17)
EGFRCR SERPLBLD CKD-EPI 2021: >90 ML/MIN/1.73M2
EGFRCR SERPLBLD CKD-EPI 2021: >90 ML/MIN/1.73M2
ERYTHROCYTE [DISTWIDTH] IN BLOOD BY AUTOMATED COUNT: 12 % (ref 10–15)
ERYTHROCYTE [DISTWIDTH] IN BLOOD BY AUTOMATED COUNT: 12 % (ref 10–15)
GLUCOSE BLDC GLUCOMTR-MCNC: 116 MG/DL (ref 70–99)
GLUCOSE SERPL-MCNC: 118 MG/DL (ref 70–99)
GLUCOSE SERPL-MCNC: 95 MG/DL (ref 70–99)
HCO3 SERPL-SCNC: 30 MMOL/L (ref 22–29)
HCO3 SERPL-SCNC: 31 MMOL/L (ref 22–29)
HCT VFR BLD AUTO: 26 % (ref 40–53)
HCT VFR BLD AUTO: 27.5 % (ref 40–53)
HGB BLD-MCNC: 8.7 G/DL (ref 13.3–17.7)
HGB BLD-MCNC: 9.2 G/DL (ref 13.3–17.7)
MAGNESIUM SERPL-MCNC: 2.3 MG/DL (ref 1.7–2.3)
MCH RBC QN AUTO: 30.4 PG (ref 26.5–33)
MCH RBC QN AUTO: 30.8 PG (ref 26.5–33)
MCHC RBC AUTO-ENTMCNC: 33.5 G/DL (ref 31.5–36.5)
MCHC RBC AUTO-ENTMCNC: 33.5 G/DL (ref 31.5–36.5)
MCV RBC AUTO: 91 FL (ref 78–100)
MCV RBC AUTO: 92 FL (ref 78–100)
PLATELET # BLD AUTO: 171 10E3/UL (ref 150–450)
PLATELET # BLD AUTO: 181 10E3/UL (ref 150–450)
POTASSIUM SERPL-SCNC: 3.8 MMOL/L (ref 3.4–5.3)
POTASSIUM SERPL-SCNC: 3.8 MMOL/L (ref 3.4–5.3)
RADIOLOGIST FLAGS: ABNORMAL
RBC # BLD AUTO: 2.86 10E6/UL (ref 4.4–5.9)
RBC # BLD AUTO: 2.99 10E6/UL (ref 4.4–5.9)
SODIUM SERPL-SCNC: 143 MMOL/L (ref 135–145)
SODIUM SERPL-SCNC: 143 MMOL/L (ref 135–145)
WBC # BLD AUTO: 6.7 10E3/UL (ref 4–11)
WBC # BLD AUTO: 7.7 10E3/UL (ref 4–11)

## 2025-06-08 PROCEDURE — 82435 ASSAY OF BLOOD CHLORIDE: CPT

## 2025-06-08 PROCEDURE — 250N000013 HC RX MED GY IP 250 OP 250 PS 637

## 2025-06-08 PROCEDURE — 250N000011 HC RX IP 250 OP 636

## 2025-06-08 PROCEDURE — 120N000002 HC R&B MED SURG/OB UMMC

## 2025-06-08 PROCEDURE — 0W9930Z DRAINAGE OF RIGHT PLEURAL CAVITY WITH DRAINAGE DEVICE, PERCUTANEOUS APPROACH: ICD-10-PCS | Performed by: THORACIC SURGERY (CARDIOTHORACIC VASCULAR SURGERY)

## 2025-06-08 PROCEDURE — 85041 AUTOMATED RBC COUNT: CPT

## 2025-06-08 PROCEDURE — 71045 X-RAY EXAM CHEST 1 VIEW: CPT | Mod: 26 | Performed by: RADIOLOGY

## 2025-06-08 PROCEDURE — 83735 ASSAY OF MAGNESIUM: CPT

## 2025-06-08 PROCEDURE — 93010 ELECTROCARDIOGRAM REPORT: CPT | Mod: XU | Performed by: INTERNAL MEDICINE

## 2025-06-08 PROCEDURE — 36415 COLL VENOUS BLD VENIPUNCTURE: CPT

## 2025-06-08 PROCEDURE — 999N000065 XR CHEST PORT 1 VIEW

## 2025-06-08 PROCEDURE — 258N000003 HC RX IP 258 OP 636

## 2025-06-08 PROCEDURE — 97535 SELF CARE MNGMENT TRAINING: CPT | Mod: GO

## 2025-06-08 PROCEDURE — 71045 X-RAY EXAM CHEST 1 VIEW: CPT | Mod: 77

## 2025-06-08 PROCEDURE — 82374 ASSAY BLOOD CARBON DIOXIDE: CPT

## 2025-06-08 PROCEDURE — 85014 HEMATOCRIT: CPT

## 2025-06-08 PROCEDURE — 250N000011 HC RX IP 250 OP 636: Mod: JW

## 2025-06-08 PROCEDURE — 93005 ELECTROCARDIOGRAM TRACING: CPT

## 2025-06-08 PROCEDURE — 71045 X-RAY EXAM CHEST 1 VIEW: CPT

## 2025-06-08 PROCEDURE — 250N000011 HC RX IP 250 OP 636: Performed by: SURGERY

## 2025-06-08 PROCEDURE — 80048 BASIC METABOLIC PNL TOTAL CA: CPT

## 2025-06-08 RX ORDER — HYDROMORPHONE HYDROCHLORIDE 1 MG/ML
0.3 INJECTION, SOLUTION INTRAMUSCULAR; INTRAVENOUS; SUBCUTANEOUS ONCE
Status: COMPLETED | OUTPATIENT
Start: 2025-06-08 | End: 2025-06-08

## 2025-06-08 RX ORDER — LORAZEPAM 2 MG/ML
1 INJECTION INTRAMUSCULAR ONCE
Status: COMPLETED | OUTPATIENT
Start: 2025-06-08 | End: 2025-06-08

## 2025-06-08 RX ORDER — ENOXAPARIN SODIUM 100 MG/ML
40 INJECTION SUBCUTANEOUS EVERY 24 HOURS
Status: DISCONTINUED | OUTPATIENT
Start: 2025-06-08 | End: 2025-06-10 | Stop reason: HOSPADM

## 2025-06-08 RX ORDER — OXYCODONE HYDROCHLORIDE 5 MG/1
5 TABLET ORAL ONCE
Refills: 0 | Status: COMPLETED | OUTPATIENT
Start: 2025-06-08 | End: 2025-06-08

## 2025-06-08 RX ORDER — HYDROMORPHONE HYDROCHLORIDE 1 MG/ML
0.3 INJECTION, SOLUTION INTRAMUSCULAR; INTRAVENOUS; SUBCUTANEOUS
Status: DISCONTINUED | OUTPATIENT
Start: 2025-06-08 | End: 2025-06-10

## 2025-06-08 RX ORDER — IBUPROFEN 200 MG
600 TABLET ORAL EVERY 6 HOURS
Status: DISCONTINUED | OUTPATIENT
Start: 2025-06-08 | End: 2025-06-09

## 2025-06-08 RX ADMIN — IBUPROFEN 600 MG: 200 TABLET, FILM COATED ORAL at 23:40

## 2025-06-08 RX ADMIN — POLYETHYLENE GLYCOL 3350 17 G: 17 POWDER, FOR SOLUTION ORAL at 08:22

## 2025-06-08 RX ADMIN — OXYCODONE HYDROCHLORIDE 5 MG: 5 TABLET ORAL at 23:40

## 2025-06-08 RX ADMIN — HYDROMORPHONE HYDROCHLORIDE 0.3 MG: 1 INJECTION, SOLUTION INTRAMUSCULAR; INTRAVENOUS; SUBCUTANEOUS at 20:07

## 2025-06-08 RX ADMIN — METHOCARBAMOL 750 MG: 750 TABLET ORAL at 08:21

## 2025-06-08 RX ADMIN — ACETAMINOPHEN 975 MG: 325 TABLET, FILM COATED ORAL at 23:40

## 2025-06-08 RX ADMIN — METHOCARBAMOL 750 MG: 750 TABLET ORAL at 14:57

## 2025-06-08 RX ADMIN — ACETAMINOPHEN 975 MG: 325 TABLET, FILM COATED ORAL at 17:04

## 2025-06-08 RX ADMIN — OXYCODONE HYDROCHLORIDE 5 MG: 5 TABLET ORAL at 20:06

## 2025-06-08 RX ADMIN — GABAPENTIN 300 MG: 300 CAPSULE ORAL at 20:07

## 2025-06-08 RX ADMIN — KETOROLAC TROMETHAMINE 15 MG: 30 INJECTION, SOLUTION INTRAMUSCULAR at 01:30

## 2025-06-08 RX ADMIN — OXYCODONE HYDROCHLORIDE 5 MG: 5 TABLET ORAL at 15:03

## 2025-06-08 RX ADMIN — METHOCARBAMOL 750 MG: 750 TABLET ORAL at 20:07

## 2025-06-08 RX ADMIN — LIDOCAINE 1 PATCH: 560 PATCH PERCUTANEOUS; TOPICAL; TRANSDERMAL at 20:07

## 2025-06-08 RX ADMIN — IBUPROFEN 600 MG: 200 TABLET, FILM COATED ORAL at 14:57

## 2025-06-08 RX ADMIN — ACETAMINOPHEN 975 MG: 325 TABLET, FILM COATED ORAL at 08:21

## 2025-06-08 RX ADMIN — GABAPENTIN 300 MG: 300 CAPSULE ORAL at 14:56

## 2025-06-08 RX ADMIN — OXYCODONE HYDROCHLORIDE 5 MG: 5 TABLET ORAL at 17:05

## 2025-06-08 RX ADMIN — LORAZEPAM 1 MG: 2 INJECTION INTRAMUSCULAR; INTRAVENOUS at 10:11

## 2025-06-08 RX ADMIN — ENOXAPARIN SODIUM 40 MG: 40 INJECTION SUBCUTANEOUS at 09:32

## 2025-06-08 RX ADMIN — FLUOXETINE HYDROCHLORIDE 20 MG: 20 CAPSULE ORAL at 08:21

## 2025-06-08 RX ADMIN — IBUPROFEN 600 MG: 200 TABLET, FILM COATED ORAL at 18:23

## 2025-06-08 RX ADMIN — KETOROLAC TROMETHAMINE 15 MG: 30 INJECTION, SOLUTION INTRAMUSCULAR at 06:38

## 2025-06-08 RX ADMIN — SODIUM CHLORIDE, SODIUM LACTATE, POTASSIUM CHLORIDE, AND CALCIUM CHLORIDE: .6; .31; .03; .02 INJECTION, SOLUTION INTRAVENOUS at 08:26

## 2025-06-08 RX ADMIN — HYDROXYZINE HYDROCHLORIDE 30 MG: 10 TABLET ORAL at 01:56

## 2025-06-08 RX ADMIN — GABAPENTIN 300 MG: 300 CAPSULE ORAL at 08:20

## 2025-06-08 ASSESSMENT — ACTIVITIES OF DAILY LIVING (ADL)
ADLS_ACUITY_SCORE: 43
ADLS_ACUITY_SCORE: 43
ADLS_ACUITY_SCORE: 38
ADLS_ACUITY_SCORE: 43
ADLS_ACUITY_SCORE: 38
ADLS_ACUITY_SCORE: 43
ADLS_ACUITY_SCORE: 43
ADLS_ACUITY_SCORE: 38
ADLS_ACUITY_SCORE: 43
ADLS_ACUITY_SCORE: 43
ADLS_ACUITY_SCORE: 38
ADLS_ACUITY_SCORE: 43
ADLS_ACUITY_SCORE: 38
ADLS_ACUITY_SCORE: 43

## 2025-06-08 NOTE — PROGRESS NOTES
"Thoracic Surgery Progress Note    PATIENT NAME: Riccardo Urias  MRN: [id number]  DATE: 06/08/2025 11:40 AM  LAST PROCEDURE: Modified Ravitch repair with retrosternal bar placement and sternal plating. Intercostal nerve cryoablation.:   6/6/2025    Subjective:  Discharged cancelled yesterday due to dizziness and orthostatic BP with ambulation. He reports that he has not yet tried to walk again since that happened. Has been on mIVF and feels that he is eating and drinking well. Noted to be more tachycardic yesterday evening into this morning. Reports some new \"heart pain\" along with his incisional pain.     Objective:  Vital Signs: Most Recent  Ranges (24 hours)   Temp: 97.7  F (36.5  C)  Pulse: 103  BP: 110/77  Resp: 20  SpO2: 97 % on None (Room air) Temp  Min: 97.7  F (36.5  C)  Max: 98.3  F (36.8  C)  Pulse  Min: 92  Max: 112  BP  Min: 101/61  Max: 122/67  Resp  Min: 16  Max: 20  SpO2  Min: 95 %  Max: 98 %     Physical Exam:  Gen: NAD, resting comfortably   CV: Regular rhythm, tachycardic  Pulm: NWOB on RA, chest incision covered with     Assessment/Plan:  Riccardo Urias is a 25 year old male with pectus excavatum s/p modified ravitch repair with retrosternal bar placement, sternal plating, intercostal nerve cryoablation    - MMPC; discontinue IV medications today  - Diet: regular  - IVF: Discontinue LR  - EKG, CXR to evaluate new pain and tachycardia->noted large right PTX->R pigtail placed   - R CT to -20cm suction  - Hgb 9.2 (12.9) but WBC and plt also decreased, no further bleeding from JESENIA site. Will monitor.   - Sternotomy precautions  - Aggressive pulmonary toilet, PT/OT, OOB, ambulate as able  - Lovenox ppx    Seen with fellow and staff.    Enriqueta Chapin  PGY-3 General Surgery   "

## 2025-06-08 NOTE — PLAN OF CARE
Goal Outcome Evaluation:      Plan of Care Reviewed With: patient    Overall Patient Progress: no changeOverall Patient Progress: no change    Outcome Evaluation: Tachycardic on RA, denied nausea, C/O moderate-severe incisional pain.    /62 (BP Location: Left arm)   Pulse 112   Temp 98.3  F (36.8  C) (Oral)   Resp 18   Ht 1.829 m (6')   Wt 61.3 kg (135 lb 2.3 oz)   SpO2 95%   BMI 18.33 kg/m      Shift: 6652-2433  Isolation Status: standard  VS: stable on RA, afebrile  Neuro: A&O x4  Behaviors: receptive to cares, flat affect  BG: daily: 116  Labs/Imaging: Hgb 12.9; pending AM labs  Respiratory: HUDSON, desat with activity but recovered  Cardiac: Tachycardic up to 120s with movement  Pain/Nausea: Denied nausea. C/O moderate-severe incisional pain  PRN: Dilaudid IV 0.4 mg x1 and Atarax 30 mg x1 for pain  Diet: regular  LDA: R PIV, L PIV  Infusion(s): LR @ 75 mL/hr  GI/: LBM PTS, passing some gas, BS+. Voiding spontaneously, AUO, uses bedside urinal  Skin: chest incision covered with op dressing, minimal dried drainage  Mobility: SBA/Ax1 d/t ortohstatics  Plan: Notify MD of any significant changes, continue current POC.

## 2025-06-08 NOTE — PLAN OF CARE
Goal Outcome Evaluation:      Plan of Care Reviewed With: patient, parent    Overall Patient Progress: no changeOverall Patient Progress: no change    Outcome Evaluation: Patient had chest tube placed at bedside today, very sleepy, poor appetite.    /77 (BP Location: Left arm)   Pulse 103   Temp 97.7  F (36.5  C) (Oral)   Resp 20   Ht 1.829 m (6')   Wt 61.3 kg (135 lb 2.3 oz)   SpO2 97%   BMI 18.33 kg/m      Shift: 9380-3926  Isolation Status: None  VS: BP stable, tachycardic on RA, afebrile.  Neuro: Aox4, lethargic today, sleeping most of shift.  BG: Daily in the morning.  Labs/Imaging: WDL  Respiratory: WDL  Cardiac: Tachycardic.  Pain/Nausea: Pain managed with scheduled gabapentin, robaxin, ibuprofen, PRN oxy.  Diet: Regular diet, poor appetite.  LDA: R and L PIV - SL, chest tube.  GI/: Voiding/urinal at bedside, no BM this shift.  Skin: Abdominal chest incision with island dressing, CDI.  Mobility: Assist x1 or 2, patient has orthostatic hypotension.  Plan: Continue plan of care.     Handoff given to following RN.

## 2025-06-08 NOTE — PROGRESS NOTES
Cross Cover Note - 6/8/2025    General Surgery Cross Cover Note    Called by RN for pain and tachycardia to the 120s.    S: Patient was seen with parents at bedside. Reports chest pain around the incision site that is making taking deep breaths difficult. Also notes increased brain fog. Parents note that patient seems more out of it today than he did yesterday. Patient denies nausea and vomiting. Has incisional pain but no changes. No CP. Other ROS negative.     O: B/P: 108/68, T: 98, P: 112, R: 20    Gen: Resting in bed, NAD, A&O x 3/3  CV: Sinus tachycardia on ECG  Resp: Poor inspiratory effort, saturating 93% on room air, R chest tube to suction w/o air leak and minimal serosanguinous output  Skin: Incision site bandage c/d/I    A/P: Riccardo Urias is POD#2 s/p modified Ravitch repair who is now having increased chest pain, tachycardia, and brain fog. Dilaudid was d/c'd 6/7 PM and ketorolac was d/c'd 6/8 AM. Notably, a new chest tube was placed today for a pneumothorax. RN notes that patient was napping all day and did not receive PRN oxycodone until this PM. De-escalation of IV pain medications and new chest tube placement are the most likely cause of his increased pain, which is likely contributing to his tachycardia and brain fog. ECG was reassuring. Given chest tube placement for pneumothorax today, will obtain CXR to evaluate pneumo as possible etiology for symptoms.    - BMP, CBC  - CXR  - IV dilaudid q2h PRN  - EKG   - catch up dose of oxy, favor oxy over dilaudid first-line      Linnea Jacob, MS4    I was present with the student and affirm the information above.     Avis Domingo MD   P: [phone]  Please refer to [facility] for point of contact. Page is preferred.

## 2025-06-08 NOTE — PLAN OF CARE
Goal Outcome Evaluation:      Plan of Care Reviewed With: patient    Overall Patient Progress: no change      Shift: 4523-4544    Blood pressure 113/62, pulse 92, temperature 98.1  F (36.7  C), temperature source Oral, resp. rate 18, height 1.829 m (6'), weight 61.3 kg (135 lb 2.3 oz), SpO2 98%.     Sternum precautions  Reason for admission: POD #1 for Pectus excavatum; Modified Ravitch repair with retrosternal bar placement and sternal plating. Intercostal nerve cryoablation.  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 Scheduled robaxin + gabapentin + tylenol  Neuro: Alert and oriented x 4, flat affect             Cardiac: Tachycardic        Respiratory: WDL, on RA   GI: LBM: PTA; reports passing some gas. Reports intermittent nausea - IV zofran given x1  : Voids using urinal   Endo: POD 2 glucose check in AM   Diet: R diet   Activity: SBA/ Ax1   Lines: R PIV SL, L PIV infusing LR @ 75 mL/hour   Wounds/Incisions/ Drains: Post op- Sternal incision, large dressing intact with dried drainage.   Patches: Lidocaine patch on R abd      Shift Updates: Manage pain, anticipated plan to be discharged tmr. POC ongoing.

## 2025-06-08 NOTE — PROCEDURES
PROCEDURE NOTE: CHEST TUBE PLACEMENT  Date: 06/08/2025  Time: 10:30 AM     Procedure: Right chest tube insertion  Indication: Large right pneumothorax    Fellow: Dr. Ruiz  Attending: Dr. Donovan     The procedure was explained to patient and parents at bedside. Informed consent was obtained. The patient was positioned supine with right-arm above the head. The patient s right-chest was prepped and draped in sterile fashion. 4-mL of 1% Lidocaine was used to anesthetize the skin area of interest and the deeper periosteum. The needle was then advanced to the level of the pleura and presence of bubbling was assessed. The needle hub was removed and the introducer wire was advanced into the pleural cavity. The needle was then removed and the dilator was advanced over the introducer until the level of the skin. A small skin knick was made. The dilator was inserted over the introducer wire and continued into the pleural space with no resistance. The dilator was then removed and the pigtail chest tube was introduced over the wire into the pleural until the level of the black chas, again without any resistance. The introducer wire was then removed and the pigtail tube was connected to the atrium to suction. Bubbling was noted in the atrium with outflow of serosanguinous pleural fluid. The chest tube was then sutured into place along the superficial chest wall and a dressing was placed.  The patient tolerated the procedure well and there were no complications. The fellow was present for the procedure. The chest tube will remain to suction. A post-procedure CXR will be obtained.    Enriqueta Chapin  PGY-3 General Surgery

## 2025-06-09 ENCOUNTER — APPOINTMENT (OUTPATIENT)
Dept: GENERAL RADIOLOGY | Facility: CLINIC | Age: 25
DRG: 516 | End: 2025-06-09
Attending: PHYSICIAN ASSISTANT
Payer: MEDICARE

## 2025-06-09 ENCOUNTER — APPOINTMENT (OUTPATIENT)
Dept: CT IMAGING | Facility: CLINIC | Age: 25
DRG: 516 | End: 2025-06-09
Payer: MEDICARE

## 2025-06-09 ENCOUNTER — APPOINTMENT (OUTPATIENT)
Dept: GENERAL RADIOLOGY | Facility: CLINIC | Age: 25
DRG: 516 | End: 2025-06-09
Attending: THORACIC SURGERY (CARDIOTHORACIC VASCULAR SURGERY)
Payer: MEDICARE

## 2025-06-09 LAB
ANION GAP SERPL CALCULATED.3IONS-SCNC: 8 MMOL/L (ref 7–15)
ATRIAL RATE - MUSE: 110 BPM
ATRIAL RATE - MUSE: 94 BPM
BUN SERPL-MCNC: 7.3 MG/DL (ref 6–20)
CALCIUM SERPL-MCNC: 8.7 MG/DL (ref 8.8–10.4)
CHLORIDE SERPL-SCNC: 104 MMOL/L (ref 98–107)
CREAT SERPL-MCNC: 0.69 MG/DL (ref 0.67–1.17)
DIASTOLIC BLOOD PRESSURE - MUSE: NORMAL MMHG
DIASTOLIC BLOOD PRESSURE - MUSE: NORMAL MMHG
EGFRCR SERPLBLD CKD-EPI 2021: >90 ML/MIN/1.73M2
ERYTHROCYTE [DISTWIDTH] IN BLOOD BY AUTOMATED COUNT: 12 % (ref 10–15)
GLUCOSE SERPL-MCNC: 97 MG/DL (ref 70–99)
HCO3 SERPL-SCNC: 31 MMOL/L (ref 22–29)
HCT VFR BLD AUTO: 27.7 % (ref 40–53)
HGB BLD-MCNC: 9 G/DL (ref 13.3–17.7)
INTERPRETATION ECG - MUSE: NORMAL
INTERPRETATION ECG - MUSE: NORMAL
MCH RBC QN AUTO: 30.7 PG (ref 26.5–33)
MCHC RBC AUTO-ENTMCNC: 32.5 G/DL (ref 31.5–36.5)
MCV RBC AUTO: 95 FL (ref 78–100)
P AXIS - MUSE: 59 DEGREES
P AXIS - MUSE: 69 DEGREES
PLATELET # BLD AUTO: 169 10E3/UL (ref 150–450)
POTASSIUM SERPL-SCNC: 3.9 MMOL/L (ref 3.4–5.3)
PR INTERVAL - MUSE: 140 MS
PR INTERVAL - MUSE: 150 MS
QRS DURATION - MUSE: 76 MS
QRS DURATION - MUSE: 94 MS
QT - MUSE: 296 MS
QT - MUSE: 318 MS
QTC - MUSE: 397 MS
QTC - MUSE: 400 MS
R AXIS - MUSE: 65 DEGREES
R AXIS - MUSE: 80 DEGREES
RADIOLOGIST FLAGS: ABNORMAL
RBC # BLD AUTO: 2.93 10E6/UL (ref 4.4–5.9)
SODIUM SERPL-SCNC: 143 MMOL/L (ref 135–145)
SYSTOLIC BLOOD PRESSURE - MUSE: NORMAL MMHG
SYSTOLIC BLOOD PRESSURE - MUSE: NORMAL MMHG
T AXIS - MUSE: -82 DEGREES
T AXIS - MUSE: 40 DEGREES
VENTRICULAR RATE- MUSE: 110 BPM
VENTRICULAR RATE- MUSE: 94 BPM
WBC # BLD AUTO: 6.4 10E3/UL (ref 4–11)

## 2025-06-09 PROCEDURE — 250N000011 HC RX IP 250 OP 636

## 2025-06-09 PROCEDURE — 80048 BASIC METABOLIC PNL TOTAL CA: CPT

## 2025-06-09 PROCEDURE — 250N000013 HC RX MED GY IP 250 OP 250 PS 637

## 2025-06-09 PROCEDURE — 71275 CT ANGIOGRAPHY CHEST: CPT

## 2025-06-09 PROCEDURE — 36415 COLL VENOUS BLD VENIPUNCTURE: CPT

## 2025-06-09 PROCEDURE — 71275 CT ANGIOGRAPHY CHEST: CPT | Mod: 26 | Performed by: RADIOLOGY

## 2025-06-09 PROCEDURE — 71045 X-RAY EXAM CHEST 1 VIEW: CPT | Mod: 26 | Performed by: RADIOLOGY

## 2025-06-09 PROCEDURE — 85014 HEMATOCRIT: CPT

## 2025-06-09 PROCEDURE — 71045 X-RAY EXAM CHEST 1 VIEW: CPT

## 2025-06-09 PROCEDURE — 82310 ASSAY OF CALCIUM: CPT

## 2025-06-09 PROCEDURE — 71045 X-RAY EXAM CHEST 1 VIEW: CPT | Mod: 77

## 2025-06-09 PROCEDURE — 250N000011 HC RX IP 250 OP 636: Mod: JW | Performed by: PHYSICIAN ASSISTANT

## 2025-06-09 PROCEDURE — 120N000002 HC R&B MED SURG/OB UMMC

## 2025-06-09 RX ORDER — KETOROLAC TROMETHAMINE 30 MG/ML
15 INJECTION, SOLUTION INTRAMUSCULAR; INTRAVENOUS EVERY 6 HOURS
Status: DISCONTINUED | OUTPATIENT
Start: 2025-06-09 | End: 2025-06-10 | Stop reason: HOSPADM

## 2025-06-09 RX ORDER — IOPAMIDOL 755 MG/ML
53 INJECTION, SOLUTION INTRAVASCULAR ONCE
Status: COMPLETED | OUTPATIENT
Start: 2025-06-09 | End: 2025-06-09

## 2025-06-09 RX ADMIN — OXYCODONE HYDROCHLORIDE 5 MG: 5 TABLET ORAL at 06:05

## 2025-06-09 RX ADMIN — OXYCODONE HYDROCHLORIDE 5 MG: 5 TABLET ORAL at 16:01

## 2025-06-09 RX ADMIN — KETOROLAC TROMETHAMINE 15 MG: 30 INJECTION, SOLUTION INTRAMUSCULAR at 18:00

## 2025-06-09 RX ADMIN — ENOXAPARIN SODIUM 40 MG: 40 INJECTION SUBCUTANEOUS at 08:01

## 2025-06-09 RX ADMIN — METHOCARBAMOL 750 MG: 750 TABLET ORAL at 19:58

## 2025-06-09 RX ADMIN — ACETAMINOPHEN 975 MG: 325 TABLET, FILM COATED ORAL at 08:01

## 2025-06-09 RX ADMIN — IOPAMIDOL 53 ML: 755 INJECTION, SOLUTION INTRAVENOUS at 02:17

## 2025-06-09 RX ADMIN — GABAPENTIN 300 MG: 300 CAPSULE ORAL at 08:01

## 2025-06-09 RX ADMIN — GABAPENTIN 300 MG: 300 CAPSULE ORAL at 13:34

## 2025-06-09 RX ADMIN — LIDOCAINE 1 PATCH: 560 PATCH PERCUTANEOUS; TOPICAL; TRANSDERMAL at 20:00

## 2025-06-09 RX ADMIN — IBUPROFEN 600 MG: 200 TABLET, FILM COATED ORAL at 06:05

## 2025-06-09 RX ADMIN — KETOROLAC TROMETHAMINE 15 MG: 30 INJECTION, SOLUTION INTRAMUSCULAR at 11:37

## 2025-06-09 RX ADMIN — FLUOXETINE HYDROCHLORIDE 20 MG: 20 CAPSULE ORAL at 08:01

## 2025-06-09 RX ADMIN — ACETAMINOPHEN 975 MG: 325 TABLET, FILM COATED ORAL at 16:01

## 2025-06-09 RX ADMIN — METHOCARBAMOL 750 MG: 750 TABLET ORAL at 08:01

## 2025-06-09 RX ADMIN — OXYCODONE HYDROCHLORIDE 5 MG: 5 TABLET ORAL at 09:24

## 2025-06-09 RX ADMIN — METHOCARBAMOL 750 MG: 750 TABLET ORAL at 13:33

## 2025-06-09 RX ADMIN — POLYETHYLENE GLYCOL 3350 17 G: 17 POWDER, FOR SOLUTION ORAL at 08:01

## 2025-06-09 RX ADMIN — GABAPENTIN 300 MG: 300 CAPSULE ORAL at 19:58

## 2025-06-09 RX ADMIN — SENNOSIDES 8.6 MG: 8.6 TABLET, FILM COATED ORAL at 16:07

## 2025-06-09 ASSESSMENT — ACTIVITIES OF DAILY LIVING (ADL)
ADLS_ACUITY_SCORE: 39
ADLS_ACUITY_SCORE: 39
ADLS_ACUITY_SCORE: 41
ADLS_ACUITY_SCORE: 41
ADLS_ACUITY_SCORE: 40
ADLS_ACUITY_SCORE: 39
ADLS_ACUITY_SCORE: 38
ADLS_ACUITY_SCORE: 40
ADLS_ACUITY_SCORE: 38
ADLS_ACUITY_SCORE: 41
ADLS_ACUITY_SCORE: 41
ADLS_ACUITY_SCORE: 39
ADLS_ACUITY_SCORE: 40
ADLS_ACUITY_SCORE: 39
ADLS_ACUITY_SCORE: 40
ADLS_ACUITY_SCORE: 41
ADLS_ACUITY_SCORE: 41
ADLS_ACUITY_SCORE: 39
ADLS_ACUITY_SCORE: 40
ADLS_ACUITY_SCORE: 39
ADLS_ACUITY_SCORE: 39

## 2025-06-09 NOTE — PLAN OF CARE
ADMIT from  2130    /73   Pulse 114   Temp 98  F (36.7  C) (Oral)   Resp 18   Ht 1.829 m (6')   Wt 61.3 kg (135 lb 2.3 oz)   SpO2 (!) 90%   BMI 18.33 kg/m      Reason for admission: Admit for sugical procedure ravitch repair due to severe pectus excavatum. POD#2  Activity: Up with assistance, has not been out of bed since arrival to  this evening. Bed alarm is on for safety due to the patient mentation at this time.   Pain: Oral oxycodone, Dilaudid IV, and Atarax.  Neuro: A/Ox4 unclear thoughts at times with garbled speech.  Cardiac: Tachy  Respiratory: Diminished LS on right side, chest tube in place, see orders. Sats dipped placed on 1 liter.   GI/: Urinal at bedside, no BM since prior to surgery, denies nausea, passing gas.  Diet: Regular diet  Lines: 2 SL PIV  Wounds: Chest tube site and surgical site  Labs/imaging: Patient currently waiting to go down for CT PE run to rule out PE.       New changes this shift: Oriented patient to new room, continues to be tachy writer spoke with surgical team on arrival to the new room and we discussed plan for CT PE run due to the ongoing tachycardia. I did place patient on 1 liter of oxygen NC due to sats dipping.      Plan: Await new test results treat pain encourage to keep pain in control and orient to surrounding.       Continue to monitor and follow POC

## 2025-06-09 NOTE — PLAN OF CARE
/68 (BP Location: Right arm, Cuff Size: Adult Regular)   Pulse 114   Temp 98  F (36.7  C) (Oral)   Resp 18   Ht 1.829 m (6')   Wt 61.3 kg (135 lb 2.3 oz)   SpO2 95%   BMI 18.33 kg/m        Goal Outcome Evaluation:    Plan of care reviewed with: patient and his parents  Overall patient progress: no change    Time: 4787-0151  Status: POD# 2 s/p modified Ravitch repair with retrosternal bar placement and sternal plating, intercostal nerve cryoablation for severe Pectus Excavaatum. Original dressing with old dried drainage. R side chest tube for pneumothorax.   Neuro/Pain: A&Ox4, hx of hypotonic cerebral palsy, hard to understand at times. C/o incisional site pain. PRN and scheduled pain med given. PRN Dilaudid iv, Oxycodone 5-10 mg, Ibuprofen, scheduled Tylenol, Gabapentin, Robaxin, Lidocaine patch. Patient reported a little better.   Activity: assist of 1-2 person per report. Prior RN reported that patient has orthostatic hypotension that needs staff presence with patient and assist him.   Cardiac/vs: tachycardia, -120s, thoracic surgery team notified and aware.   Respiratory: No respiratory distress noted. Chest tube on -20 suction, serosanguinous output.   GI/: no bm, passing gas. Active bowel sound. Voiding spontaneously with bedside urinal.   Diet: regular. Denied nausea/vomiting. Good appetite. Ate 100% for dinner.   Skin: incisional dressing has small old drainage. Chest tube dressing on right chest.   LDAs: PIV x 2 saline locked. Chest tube on -20 suction.   Labs/Imaging: reviewed.   Change this shift: none   Plan: pain management. Chest tube okay water seal for ambulation. Transferred to  at around 2145. Nursing report given.

## 2025-06-09 NOTE — PROGRESS NOTES
Nursing Focus: Admission    D: Patient admitted/transferred from  via bed for ongoing care post op day 2 of Rantich procedure.      I: Upon arrival to the unit patient was oriented to room, unit, and call light. Patient s height, weight, and vital signs were obtained. MD notified of patient s arrival on the unit.  Head to toe assessment completed. Education assessment completed. Care plan initiated.     A: Vital signs done continues to be tachy and low sats noted upon admission. Patient rates pain at 3/10. Two RN skin assessment completed Yes. Second RN was Celestino TRUJILLO Significant Skin Findings include Surgical site covered, small amount old drainage and chest tube site. [facility] Nurse Consult Ordered  No. Bed Algorithm can be found in PCS flow sheets (Support Surface Algorithm) and on IP [facility] NURSE RESOURCE TAB, was this used during this assessment? No.     P: Continue to monitor patient s VS and intervene as needed. Continue with plan of care. Notify MD with any concerns or changes in patient status.

## 2025-06-09 NOTE — PROGRESS NOTES
"Thoracic Surgery Progress Note    Subjective:  Doing well.  Did have some weakness when attempting to ambulate this a.m.    Objective:  Vital Signs: Most Recent  Ranges (24 hours)   Temp: 97.9  F (36.6  C)  Pulse: 95  BP: 111/77  Resp: 16  SpO2: 96 % on None (Room air) Temp  Min: 97.9  F (36.6  C)  Max: 98.2  F (36.8  C)  Pulse  Min: 95  Max: 114  BP  Min: 108/68  Max: 119/73  Resp  Min: 16  Max: 20  SpO2  Min: 90 %  Max: 96 %     Physical Exam:  Gen: NAD, resting comfortably   CV: Regular rhythm, tachycardic  Pulm: NWOB on RA, chest incision covered, right chest tube titling, no airleak    Assessment/Plan:  Riccardo Urias is a 25 year old male with pectus excavatum s/p modified ravitch repair with retrosternal bar placement, sternal plating, intercostal nerve cryoablation    - MMPC; oral medications only today  - Clamp right chest tube today, repeat chest x-ray this p.m. and tomorrow a.m.  -Hemoglobin stabilized  - Sternotomy precautions  - Aggressive pulmonary toilet, PT/OT, OOB, ambulate as able  - Lovenox ppx    Seen and discussed with staff    Noe Cruz PA-C  Thoracic & Foregut Surgery    To page Thoracic Surgery team, click here: [facility]   Choose \"On Call\" tab at top, then use the search box for \"Thoracic\", then select the \"THORACIC SURGERY /[facility]\"    "

## 2025-06-09 NOTE — PROVIDER NOTIFICATION
Patient had surgical procedure of ravitch repair due to severe pectus excavatum. Today is POD 3. The team saw patient and chest tube was clamped, chest xray ordered. Tolerating regular diet but did not eat much today. Assisted patient to sit at the edge of the bed and stood up for a while, slightly dizzy. PT/OT ordered. Incision site on the upper chest area intact clean and no drainage noted. PIV x 2 is saline locked. Pain is being managed with oxycodone, gabapentin, and started him on IV Toradol. Voids in urinal,

## 2025-06-09 NOTE — PLAN OF CARE
Goal Outcome Evaluation:      Plan of Care Reviewed With: patient    Overall Patient Progress: no changeOverall Patient Progress: no change     Time    /73   Pulse 114   Temp 98  F (36.7  C) (Oral)   Resp 18   Ht 1.829 m (6')   Wt 61.3 kg (135 lb 2.3 oz)   SpO2 (!) 90%   BMI 18.33 kg/m      Activity: Assistive 1  Pain: 7/10 chest incisional pain being managed with prn oxycodone and scheduled pain meds  Neuro: A&O X4  Cardiac: WNL  Respiratory: WNL  GI/: Voiding urine spontaneously and passing gas   Diet: Reg diet  Lines: Chest tube to water seal with bright red output  Wounds: PIV X2 SL  Labs/imaging:       New changes this shift: Pt had a chest ct done      Plan:       Continue to monitor and follow POC

## 2025-06-10 ENCOUNTER — APPOINTMENT (OUTPATIENT)
Dept: GENERAL RADIOLOGY | Facility: CLINIC | Age: 25
DRG: 516 | End: 2025-06-10
Attending: PHYSICIAN ASSISTANT
Payer: MEDICARE

## 2025-06-10 ENCOUNTER — APPOINTMENT (OUTPATIENT)
Dept: GENERAL RADIOLOGY | Facility: CLINIC | Age: 25
DRG: 516 | End: 2025-06-10
Attending: THORACIC SURGERY (CARDIOTHORACIC VASCULAR SURGERY)
Payer: MEDICARE

## 2025-06-10 VITALS
OXYGEN SATURATION: 95 % | BODY MASS INDEX: 19.06 KG/M2 | HEART RATE: 108 BPM | HEIGHT: 72 IN | DIASTOLIC BLOOD PRESSURE: 76 MMHG | WEIGHT: 140.7 LBS | TEMPERATURE: 98 F | SYSTOLIC BLOOD PRESSURE: 116 MMHG | RESPIRATION RATE: 18 BRPM

## 2025-06-10 PROCEDURE — 250N000013 HC RX MED GY IP 250 OP 250 PS 637: Performed by: SURGERY

## 2025-06-10 PROCEDURE — 999N000147 HC STATISTIC PT IP EVAL DEFER

## 2025-06-10 PROCEDURE — 250N000013 HC RX MED GY IP 250 OP 250 PS 637

## 2025-06-10 PROCEDURE — 71045 X-RAY EXAM CHEST 1 VIEW: CPT

## 2025-06-10 PROCEDURE — 71045 X-RAY EXAM CHEST 1 VIEW: CPT | Mod: 26 | Performed by: RADIOLOGY

## 2025-06-10 PROCEDURE — 250N000011 HC RX IP 250 OP 636: Mod: JW | Performed by: PHYSICIAN ASSISTANT

## 2025-06-10 PROCEDURE — 250N000011 HC RX IP 250 OP 636

## 2025-06-10 PROCEDURE — 71045 X-RAY EXAM CHEST 1 VIEW: CPT | Mod: 77

## 2025-06-10 RX ORDER — SIMETHICONE 80 MG
80 TABLET,CHEWABLE ORAL EVERY 6 HOURS PRN
Status: DISCONTINUED | OUTPATIENT
Start: 2025-06-10 | End: 2025-06-10 | Stop reason: HOSPADM

## 2025-06-10 RX ORDER — OXYCODONE HYDROCHLORIDE 5 MG/1
5 TABLET ORAL EVERY 4 HOURS PRN
Qty: 40 TABLET | Refills: 0 | Status: SHIPPED | OUTPATIENT
Start: 2025-06-10

## 2025-06-10 RX ORDER — ACETAMINOPHEN 325 MG/1
975 TABLET ORAL EVERY 6 HOURS PRN
COMMUNITY
Start: 2025-06-10

## 2025-06-10 RX ORDER — GABAPENTIN 300 MG/1
300 CAPSULE ORAL 2 TIMES DAILY
Qty: 120 CAPSULE | Refills: 0 | Status: ACTIVE | OUTPATIENT
Start: 2025-06-10

## 2025-06-10 RX ADMIN — OXYCODONE HYDROCHLORIDE 10 MG: 5 TABLET ORAL at 04:30

## 2025-06-10 RX ADMIN — GABAPENTIN 300 MG: 300 CAPSULE ORAL at 08:30

## 2025-06-10 RX ADMIN — METHOCARBAMOL 750 MG: 750 TABLET ORAL at 08:30

## 2025-06-10 RX ADMIN — ENOXAPARIN SODIUM 40 MG: 40 INJECTION SUBCUTANEOUS at 08:30

## 2025-06-10 RX ADMIN — SIMETHICONE 80 MG: 80 TABLET, CHEWABLE ORAL at 06:26

## 2025-06-10 RX ADMIN — GABAPENTIN 300 MG: 300 CAPSULE ORAL at 13:00

## 2025-06-10 RX ADMIN — KETOROLAC TROMETHAMINE 15 MG: 30 INJECTION, SOLUTION INTRAMUSCULAR at 06:07

## 2025-06-10 RX ADMIN — METHOCARBAMOL 750 MG: 750 TABLET ORAL at 13:00

## 2025-06-10 RX ADMIN — POLYETHYLENE GLYCOL 3350 17 G: 17 POWDER, FOR SOLUTION ORAL at 08:30

## 2025-06-10 RX ADMIN — KETOROLAC TROMETHAMINE 15 MG: 30 INJECTION, SOLUTION INTRAMUSCULAR at 12:55

## 2025-06-10 RX ADMIN — ACETAMINOPHEN 975 MG: 325 TABLET, FILM COATED ORAL at 00:06

## 2025-06-10 RX ADMIN — OXYCODONE HYDROCHLORIDE 5 MG: 5 TABLET ORAL at 00:11

## 2025-06-10 RX ADMIN — ACETAMINOPHEN 975 MG: 325 TABLET, FILM COATED ORAL at 08:30

## 2025-06-10 RX ADMIN — FLUOXETINE HYDROCHLORIDE 20 MG: 20 CAPSULE ORAL at 08:30

## 2025-06-10 RX ADMIN — KETOROLAC TROMETHAMINE 15 MG: 30 INJECTION, SOLUTION INTRAMUSCULAR at 00:06

## 2025-06-10 ASSESSMENT — ACTIVITIES OF DAILY LIVING (ADL)
ADLS_ACUITY_SCORE: 39

## 2025-06-10 NOTE — DISCHARGE SUMMARY
NAME: Riccardo Urias   MRN: [id number]   : 2000     DATE OF ADMISSION: 2025     PRE/POSTOPERATIVE DIAGNOSES: Severe Pectus Excavatum    PROCEDURES PERFORMED: Modified Ravitch repair with retrosternal bar placement and sternal plating, Intercostal nerve cryoablation (2, 3, 4 bilateral) with Dr. Pardo - 2025    PATHOLOGY RESULTS: None     CULTURE RESULTS: None     INTRAOPERATIVE COMPLICATIONS: None     POSTOPERATIVE MEDICAL ISSUES: Post-pull pneumothorax    DRAINS/TUBES PRESENT AT DISCHARGE: dressing changes    DATE OF DISCHARGE:  6/10/2025    HOSPITAL COURSE: Riccardo Urias is a 25 year old male who on 2025 underwent the above-named procedures.  He tolerated the operation well and postoperatively was transferred to the general post-surgical unit. His drain was removed , unfortunately a pneumothorax was seen on imaging after tube removal and a new pleural tube was placed. He experienced a couple episode of orthostatic hypotension which resolved with time. The tube was clamped for 24hrs prior to removal on 6/10 without concerns.  Prior to discharge, his pain was controlled well, he was at baseline mobility, and had full return of bladder function.  On 6/10, he was discharged home in stable condition.    DISCHARGE EXAM:   A&O, NAD  Resp non-labored  Distal extremities warm    Incisions CDI  Pleural tube site without concern     DISCHARGE INSTRUCTIONS:  Discharge Procedure Orders   Physical Therapy  Referral   Standing Status: Future   Referral Priority: Routine Referral Type: Rehab Therapy Physical Therapy   Number of Visits Requested: 1     Reason for your hospital stay   Order Comments: Modified ravitch     Reason for your hospital stay   Order Comments: Surgery     Activity   Order Comments: Your activity upon discharge: activity as tolerated     Order Specific Question Answer Comments   Is discharge order? Yes      ADULT [facility] Specialty Follow-up and recommended labs and tests    Order Comments: 1.) Follow up with primary care physician, Marilyn Nur, in 1-2 weeks.  2.) Follow up with Thoracic Surgery on July 9, 2025 with imaging prior, details below:    7/9/2025:  Xray General  1:05 PM  (20 min.)  [id number]  [facility] [place]    Return Patient  1:45 PM  (30 min.)  Kristi Lezama MD  [facility]        Appointments on [place] and/or [facility] (with [facility] or [facility] provider or service). Call [phone] if you haven't heard regarding these appointments within 7 days of discharge.     Discharge Instructions   Order Comments: THORACIC SURGERY DISCHARGE INSTRUCTIONS    DIET: Regular diet as tolerated    If your plans upon discharge include prolonged periods of sitting (i.e a lengthy car or plane ride), it is highly beneficial to get up and walk at least once per hour to help prevent swelling and blood clots.     You may remove chest tube dressing 48 hours after tube removal and bandage the site at your own discretion thereafter.  Small amounts of leakage are normal for 2-3 days after removal.  Feel free to call with questions.    You may get incision wet 2 days after operation. Do not submerge, soak, or scrub incision or swim until seen in follow-up.    Take incentive spirometer home for continued frequent use    PECTUS REPAIR LIFTING RESTRICTIONS  -LIFT NO MORE THAN 10 LBS FOR 2 MONTHS  -START OUTPATIENT PHYSICAL THERAPY AT 2 MONTHS    Stay hydrated. Take over the counter fiber (metamucil or benefiber) and stool softeners (Miralax, docusate or senna) if becoming constipated.     Call for fever greater than 101.5, chills, increased size of incision, red skin around incision, vision changes, muscle strength changes, sensation changes, shortness of breath, or other concerns.    No driving while taking narcotic pain medication.    Transition to ibuprofen or tylenol/acetaminophen for pain control. Do not take  "tylenol/acetaminophen and acetaminophen containing narcotic (e.g., percocet or vicodin) at the same time. If you have known ulcer problems, or kidney trouble (elevated creatinine) do not take the ibuprofen.    If you think you will need a refill on your pain medications, you should call the thoracic surgery team at the number below at least 24hrs prior to running out.  It is also more difficult to provide refills Friday afternoon and over the weekend, so call before those times if possible.     In emergencies, call [phone]    For other Questions or Concerns;   A.) During weekday working hours (Monday through Friday 8am to 4:30pm)   call [phone] and ask to speak to a thoracic surgery nurse (RN or LPN).     B.) At nights (after 4:30pm), on weekends, or if urgent call [phone] and   tell the  \"I would like to page job code [phone], the thoracic surgery   fellow on call, please.\"     Diet   Order Comments: Follow this diet upon discharge: Current Diet:Orders Placed This Encounter      Regular Diet Adult     Order Specific Question Answer Comments   Is discharge order? Yes      Hospital Follow-up with Existing Primary Care Provider (PCP)   Standing Status: Future Standing Exp. Date: 07/07/25   Order Comments:       Order Specific Question Answer Comments   Schedule Primary Care visit within 30 Days        DISCHARGE MEDICATIONS:   Current Discharge Medication List        START taking these medications    Details   acetaminophen (TYLENOL) 325 MG tablet Take 3 tablets (975 mg) by mouth every 6 hours as needed for mild pain.    Associated Diagnoses: Pectus excavatum      cyclobenzaprine (FLEXERIL) 10 MG tablet Take 1 tablet (10 mg) by mouth 3 times daily as needed for muscle spasms.  Qty: 30 tablet, Refills: 0    Associated Diagnoses: Pectus excavatum      gabapentin (NEURONTIN) 300 MG capsule Take 1 capsule (300 mg) by mouth 2 times daily.  Qty: 120 capsule, Refills: 0    Associated Diagnoses: Pectus " excavatum      ibuprofen (ADVIL/MOTRIN) 600 MG tablet Take 1 tablet (600 mg) by mouth every 6 hours as needed for moderate pain.  Qty: 30 tablet, Refills: 0    Associated Diagnoses: Pectus excavatum      magnesium hydroxide (MILK OF MAGNESIA) 400 MG/5ML suspension Take 15 mLs by mouth daily as needed for constipation or heartburn.  Qty: 354 mL, Refills: 0    Associated Diagnoses: Pectus excavatum      methocarbamol (ROBAXIN) 750 MG tablet Take 1 tablet (750 mg) by mouth 3 times daily.  Qty: 30 tablet, Refills: 0    Associated Diagnoses: Pectus excavatum      oxyCODONE (ROXICODONE) 5 MG tablet Take 1 tablet (5 mg) by mouth every 4 hours as needed for pain.  Qty: 40 tablet, Refills: 0    Associated Diagnoses: Pectus excavatum      polyethylene glycol (MIRALAX) 17 GM/Dose powder Take 17 g (1 Capful) by mouth 2 times daily.  Qty: 510 g, Refills: 0    Associated Diagnoses: Pectus excavatum      sennosides (SENOKOT) 8.6 MG tablet Take 1 tablet by mouth 2 times daily.  Qty: 60 tablet, Refills: 0    Associated Diagnoses: Pectus excavatum           CONTINUE these medications which have NOT CHANGED    Details   FLUoxetine (PROZAC) 20 MG capsule Take 20 mg by mouth every morning.

## 2025-06-10 NOTE — PLAN OF CARE
/76 (BP Location: Left arm)   Pulse 107   Temp 97.4  F (36.3  C) (Oral)   Resp 18   Ht 1.829 m (6')   Wt 64.9 kg (143 lb)   SpO2 95%   BMI 19.39 kg/m      [id number]    Patient A&Ox4, on room air, SBA, regular diet tolerated well with a good appetite, afebrile, tachycardic but OVSS. Denies pain, nausea, and SOB. Voiding spontaneously, no BM yet. Chest tube remains clamped. Continue POC.

## 2025-06-10 NOTE — PLAN OF CARE
PT 5A: cancel/defer    PT order received and initially held. Anticipated discharge to home today, OT follow for safe discharge needs. Anticipate will benefit from OP therapies following discharge. PT to complete orders and sign off.

## 2025-06-10 NOTE — PLAN OF CARE
[id number]    Nursing Focus: Discharge    D: Patient discharged to home at 13:20. Patient in wheelchair and accompanied by transport and parents.    I: Discharge prescriptions sent to discharge pharmacy to be filled. All discharge medications and instructions reviewed with bedside RN. Patient instructed to call clinic triage nurse if he experiences a fever >101.5 uncontrolled nausea, vomiting, diarrhea, or pain; or experiences any signs or symptoms of bleeding. Other phone numbers to call with questions or concerns after discharge reviewed. PIV x2 removed. Education completed.    A: Him and his parents verbalized understanding of discharge medications and instructions. Patient will  medications at discharge pharmacy.     P: Patient to follow-up in clinic on July 9th with chest x-ray.       Significant 24 hour events:    Chest tube removed, discharged home    Level of Care: Acute    Summary Type: 5A Post Op Report   POD: #3 = 6/10  Complications: -   Pain:  Controlled  Neuro:.WDL except, speech  CV:.WDL except, rhythm  Resp:WDL  GI:WDL  :WDL  Skin: .WDL except, integrity  Activity Assistance: assistance, stand-by  Safety/Falls Risk: Level of Risk: Moderate Risk  Consults: PT/OT  Procedures/Imaging: Cx xrays (cereal), Cx CT  Precautions: Bleeding Precautions and Fall Risk Precautions      Goal Outcome Evaluation:      Plan of Care Reviewed With: patient, family    Overall Patient Progress: improvingOverall Patient Progress: improving    Outcome Evaluation: POD # 3

## 2025-06-10 NOTE — PROGRESS NOTES
[id number]:  Pt resting overnight. VSS, on RA. Incisional chest pain rated 3-9/10, prn oxycodone and dwayne Toradol and tylenol helping. Got simethicone ordered this AM for abd cramping/pain. +bs and flatus, -n/v, voiding fine. Chest incision open to air, WNL. Right chest tube site CDI, tube clamped per order. Right lung sounds diminished, no changes. SBA when OOB, tolerating. PIV x2 SL. No acute events overnight.     Marianela Berry RN on 6/10/2025 at 7:37 AM

## 2025-06-10 NOTE — PLAN OF CARE
Occupational Therapy Discharge Summary    Reason for therapy discharge:    Discharged to home.    Progress towards therapy goal(s). See goals on Care Plan in [facility] electronic health record for goal details.  Goals partially met.  Barriers to achieving goals:   discharge from facility.    Therapy recommendation(s):    No further therapy is recommended.  REC assist as needed from parents for ADLs and IADLs to maintain post-surgical precautions and lifting restrictions.

## 2025-06-12 ENCOUNTER — DOCUMENTATION ONLY (OUTPATIENT)
Dept: SURGERY | Facility: CLINIC | Age: 25
End: 2025-06-12
Payer: MEDICARE

## 2025-06-12 NOTE — PROGRESS NOTES
Received call from Yahaira with [facility] in [place]. Yahaira with questions regarding Physical Therapy orders received for patient. States orders were vague and with no protocols or start dates.     Reviewed with prescribing provider. Confirmed PT is to start 2 months after surgery with approximate start date of 8/10/2025 and PT is to eval and treat per diagnosis of Pectus Excavatum. All information is noted in PT referral order.     Called and provided Yahaira with above information.   Faxed PT order and Discharge Summary to [phone].  Confirmation of successful transmission received.     Sharon Quispe RN, BSN  Thoracic Surgery RN Care Coordinator

## 2025-07-09 ENCOUNTER — ANCILLARY PROCEDURE (OUTPATIENT)
Dept: GENERAL RADIOLOGY | Facility: CLINIC | Age: 25
End: 2025-07-09
Attending: CLINICAL NURSE SPECIALIST
Payer: MEDICARE

## 2025-07-09 ENCOUNTER — ONCOLOGY VISIT (OUTPATIENT)
Dept: SURGERY | Facility: CLINIC | Age: 25
End: 2025-07-09
Attending: THORACIC SURGERY (CARDIOTHORACIC VASCULAR SURGERY)
Payer: MEDICARE

## 2025-07-09 VITALS
TEMPERATURE: 98.1 F | DIASTOLIC BLOOD PRESSURE: 70 MMHG | HEART RATE: 91 BPM | RESPIRATION RATE: 24 BRPM | OXYGEN SATURATION: 98 % | SYSTOLIC BLOOD PRESSURE: 104 MMHG | BODY MASS INDEX: 18.58 KG/M2 | WEIGHT: 137 LBS

## 2025-07-09 DIAGNOSIS — Q67.6 PECTUS EXCAVATUM: Primary | ICD-10-CM

## 2025-07-09 DIAGNOSIS — Q67.6 PECTUS EXCAVATUM: ICD-10-CM

## 2025-07-09 PROCEDURE — 71046 X-RAY EXAM CHEST 2 VIEWS: CPT | Performed by: RADIOLOGY

## 2025-07-09 PROCEDURE — 99024 POSTOP FOLLOW-UP VISIT: CPT | Performed by: THORACIC SURGERY (CARDIOTHORACIC VASCULAR SURGERY)

## 2025-07-09 PROCEDURE — G0463 HOSPITAL OUTPT CLINIC VISIT: HCPCS | Performed by: THORACIC SURGERY (CARDIOTHORACIC VASCULAR SURGERY)

## 2025-07-09 ASSESSMENT — PAIN SCALES - GENERAL: PAINLEVEL_OUTOF10: NO PAIN (0)

## 2025-07-09 NOTE — LETTER
7/9/2025      Riccardo Urias  [address]  [place] [address]      Dear Colleague,    Thank you for referring your patient, Riccardo Urias, to the [facility]. Please see a copy of my visit note below.    THORACIC SURGERY FOLLOW UP VISIT    I saw Mr. Riccardo Urias in follow-up today. The clinical summary follows:     DIAGNOSIS   Pectus excavatum  PROCEDURE   6/6/25 Modified Ravitch repair with retrosternal bar placement and sternal plating. Intercostal nerve cryoablation (2, 3, 4 bilateral).     COMPLICATIONS  Pneumothorax requiring a pleural tube in PACU.     INTERVAL STUDIES  CXR        Past Medical History:   Diagnosis Date     Anxiety      History of blood transfusion      History of seizures      Hypotonic cerebral palsy (H)     mild     Pectus excavatum      TBI (traumatic brain injury) (H)      Past Surgical History:   Procedure Laterality Date     CRANIOTOMY  2016     REPAIR PECTUS EXCAVATUM N/A 6/6/2025    Procedure: Modified Ravitch repair with retrosternal bar placement and sternal plating. Intercostal nerve cryoablation.;  Surgeon: Kristi Lezama MD;  Location: [facility] OR     TRACHEOSTOMY  2016        Allergies   Allergen Reactions     Dust Mite Extract      Pollen Extract      Current Outpatient Medications   Medication Sig Dispense Refill     acetaminophen (TYLENOL) 325 MG tablet Take 3 tablets (975 mg) by mouth every 6 hours as needed for mild pain.       cyclobenzaprine (FLEXERIL) 10 MG tablet Take 1 tablet (10 mg) by mouth 3 times daily as needed for muscle spasms. 30 tablet 0     FLUoxetine (PROZAC) 20 MG capsule Take 20 mg by mouth every morning.       gabapentin (NEURONTIN) 300 MG capsule Take 1 capsule (300 mg) by mouth 2 times daily. 120 capsule 0     ibuprofen (ADVIL/MOTRIN) 600 MG tablet Take 1 tablet (600 mg) by mouth every 6 hours as needed for moderate pain. 30 tablet 0     magnesium hydroxide (MILK OF MAGNESIA) 400 MG/5ML suspension Take 15 mLs by  mouth daily as needed for constipation or heartburn. 354 mL 0     methocarbamol (ROBAXIN) 750 MG tablet Take 1 tablet (750 mg) by mouth 3 times daily. 30 tablet 0     oxyCODONE (ROXICODONE) 5 MG tablet Take 1 tablet (5 mg) by mouth every 4 hours as needed for pain. 40 tablet 0     polyethylene glycol (MIRALAX) 17 GM/Dose powder Take 17 g (1 Capful) by mouth 2 times daily. 510 g 0     sennosides (SENOKOT) 8.6 MG tablet Take 1 tablet by mouth 2 times daily. 60 tablet 0     No current facility-administered medications for this visit.     Social History     Tobacco Use     Smoking status: Never     Smokeless tobacco: Never   Substance Use Topics     Alcohol use: Yes     Comment: Occasional     Drug use: Not Currently       Exam   /70 (BP Location: Right arm, Patient Position: Sitting, Cuff Size: Adult Regular)   Pulse 91   Temp 98.1  F (36.7  C) (Oral)   Resp 24   Wt 62.1 kg (137 lb)   SpO2 98%   BMI 18.58 kg/m    Alert and oriented NAD  Bilateral breath sounds.     SUBJECTIVE  Riccardo is doing great! He has minimal pain and is no longer taking analgesics. He has no fever or chills. No dyspnea.     From a personal perspective, he comes to clinic with his mom.     IMPRESSION   25 year old male patient with pectus excavatum 1 month after Ravitch repair.    PLAN  I spent 30 min on the date of the encounter in chart review, patient visit, review of tests, documentation and/or discussion with other providers about the issues documented above. I reviewed the plan as follows:  Follow up with me in 1 year. Planned retrosternal bar removal in 2027.   He is scheduled to start PT in August.   All questions were answered and the patient and present family were in agreement with the plan.  I appreciate the opportunity to participate in the care of your patient and will keep you updated.  Sincerely,  Kristi Gibson MD        Again, thank you for allowing me to participate in the care of your patient.         Sincerely,        Kenney Pardo MD    Electronically signed

## 2025-07-09 NOTE — PROGRESS NOTES
THORACIC SURGERY FOLLOW UP VISIT    I saw Mr. Riccardo Urias in follow-up today. The clinical summary follows:     DIAGNOSIS   Pectus excavatum  PROCEDURE   6/6/25 Modified Ravitch repair with retrosternal bar placement and sternal plating. Intercostal nerve cryoablation (2, 3, 4 bilateral).     COMPLICATIONS  Pneumothorax requiring a pleural tube in PACU.     INTERVAL STUDIES  CXR        Past Medical History:   Diagnosis Date    Anxiety     History of blood transfusion     History of seizures     Hypotonic cerebral palsy (H)     mild    Pectus excavatum     TBI (traumatic brain injury) (H)      Past Surgical History:   Procedure Laterality Date    CRANIOTOMY  2016    REPAIR PECTUS EXCAVATUM N/A 6/6/2025    Procedure: Modified Ravitch repair with retrosternal bar placement and sternal plating. Intercostal nerve cryoablation.;  Surgeon: Krisit Lezama MD;  Location: [facility] OR    TRACHEOSTOMY  2016        Allergies   Allergen Reactions    Dust Mite Extract     Pollen Extract      Current Outpatient Medications   Medication Sig Dispense Refill    acetaminophen (TYLENOL) 325 MG tablet Take 3 tablets (975 mg) by mouth every 6 hours as needed for mild pain.      cyclobenzaprine (FLEXERIL) 10 MG tablet Take 1 tablet (10 mg) by mouth 3 times daily as needed for muscle spasms. 30 tablet 0    FLUoxetine (PROZAC) 20 MG capsule Take 20 mg by mouth every morning.      gabapentin (NEURONTIN) 300 MG capsule Take 1 capsule (300 mg) by mouth 2 times daily. 120 capsule 0    ibuprofen (ADVIL/MOTRIN) 600 MG tablet Take 1 tablet (600 mg) by mouth every 6 hours as needed for moderate pain. 30 tablet 0    magnesium hydroxide (MILK OF MAGNESIA) 400 MG/5ML suspension Take 15 mLs by mouth daily as needed for constipation or heartburn. 354 mL 0    methocarbamol (ROBAXIN) 750 MG tablet Take 1 tablet (750 mg) by mouth 3 times daily. 30 tablet 0    oxyCODONE (ROXICODONE) 5 MG tablet Take 1 tablet (5 mg) by mouth every 4 hours as needed for  pain. 40 tablet 0    polyethylene glycol (MIRALAX) 17 GM/Dose powder Take 17 g (1 Capful) by mouth 2 times daily. 510 g 0    sennosides (SENOKOT) 8.6 MG tablet Take 1 tablet by mouth 2 times daily. 60 tablet 0     No current facility-administered medications for this visit.     Social History     Tobacco Use    Smoking status: Never    Smokeless tobacco: Never   Substance Use Topics    Alcohol use: Yes     Comment: Occasional    Drug use: Not Currently       Exam   /70 (BP Location: Right arm, Patient Position: Sitting, Cuff Size: Adult Regular)   Pulse 91   Temp 98.1  F (36.7  C) (Oral)   Resp 24   Wt 62.1 kg (137 lb)   SpO2 98%   BMI 18.58 kg/m    Alert and oriented NAD  Bilateral breath sounds.     SUBJECTIVE  Riccardo is doing great! He has minimal pain and is no longer taking analgesics. He has no fever or chills. No dyspnea.     From a personal perspective, he comes to clinic with his mom.     IMPRESSION   25 year old male patient with pectus excavatum 1 month after Ravitch repair.    PLAN  I spent 30 min on the date of the encounter in chart review, patient visit, review of tests, documentation and/or discussion with other providers about the issues documented above. I reviewed the plan as follows:  Follow up with me in 1 year. Planned retrosternal bar removal in 2027.   He is scheduled to start PT in August.   All questions were answered and the patient and present family were in agreement with the plan.  I appreciate the opportunity to participate in the care of your patient and will keep you updated.  Sincerely,  Kristi Gibson MD

## 2025-07-09 NOTE — NURSING NOTE
Oncology Rooming Note    July 9, 2025 1:57 PM   Riccardo Urias is a 25 year old male who presents for:    Chief Complaint   Patient presents with    Oncology Clinic Visit     RTN Pectus excavatum      Initial Vitals: /70 (BP Location: Right arm, Patient Position: Sitting, Cuff Size: Adult Regular)   Pulse 91   Temp 98.1  F (36.7  C) (Oral)   Resp 24   Wt 62.1 kg (137 lb)   SpO2 98%   BMI 18.58 kg/m   Estimated body mass index is 18.58 kg/m  as calculated from the following:    Height as of 6/6/25: 1.829 m (6').    Weight as of this encounter: 62.1 kg (137 lb). Body surface area is 1.78 meters squared.  No Pain (0) Comment: Data Unavailable   No LMP for male patient.  Allergies reviewed: Yes  Medications reviewed: Yes    Medications: Medication refills not needed today.  Pharmacy name entered into [facility]:    [id number] [place], [place] - [address] [id number] [place], [place] - [address] [facility] [address]     Frailty Screening:   Is the patient here for a new oncology consult visit in cancer care? 2. No    PHQ9:  Did this patient require a PHQ9?: No      Clinical concerns: Would like to discuss when bar would be removed, and do stitches need to be removed?      Amanda Nair

## (undated) DEVICE — ADH LIQUID MASTISOL TOPICAL VIAL 2-3ML 0523-48

## (undated) DEVICE — BATERY MAXDRIVE

## (undated) DEVICE — DRAPE IOBAN INCISE 23X17" 6650EZ

## (undated) DEVICE — DRAIN JACKSON PRATT RESERVOIR 400ML SU130-1000

## (undated) DEVICE — SU SILK 0 SH 30" K834H

## (undated) DEVICE — ESU ELEC BLADE 2.75" COATED/INSULATED E1455

## (undated) DEVICE — DRSG PRIMAPORE 02X3" 7133

## (undated) DEVICE — LINEN TOWEL PACK X5 5464

## (undated) DEVICE — SUCTION FRAZIER 12FR W/HANDLE K73

## (undated) DEVICE — SPONGE SURGIFOAM 100 1974

## (undated) DEVICE — DRSG MEDIPORE 3 1/2X13 3/4" 3573

## (undated) DEVICE — GLOVE LINER HALF FINGER NYLON KP5015/50

## (undated) DEVICE — SOL WATER IRRIG 1000ML BOTTLE 2F7114

## (undated) DEVICE — DRAIN JACKSON PRATT 19FR ROUND SU130-1325

## (undated) DEVICE — GLOVE PROTEXIS MICRO 7.5 LT BLUE 2D73PM75

## (undated) DEVICE — Device

## (undated) DEVICE — DRAPE SHEET REV FOLD 3/4 9349

## (undated) DEVICE — BLADE SAW OFFSET FAN STRK 30X30X0.38MM 5400-134-279

## (undated) DEVICE — DRAIN CHEST TUBE 28FR STR 8028

## (undated) DEVICE — PACK THORACOTOMY UMMC LF SCV32THF13

## (undated) DEVICE — NDL BLUNT 18GA 1" W/O FILTER 305181

## (undated) DEVICE — BONE WAX 2.5GM W31G

## (undated) DEVICE — GLOVE PROTEXIS BLUE W/NEU-THERA 8.0  2D73EB80

## (undated) DEVICE — SU PDS II 0 CTX 36" Z370T

## (undated) DEVICE — SU UMBILICAL TAPE .125X30" U11T

## (undated) DEVICE — SU VICRYL 2-0 SH 27" UND J417H

## (undated) DEVICE — SU VICRYL 4-0 PS-2 18" UND J496H

## (undated) DEVICE — LINEN TOWEL PACK X6 WHITE 5487

## (undated) DEVICE — SUCTION MANIFOLD NEPTUNE 2 SYS 4 PORT 0702-020-000

## (undated) DEVICE — PREP CHLORAPREP 26ML TINTED HI-LITE ORANGE 930815

## (undated) DEVICE — DRSG STERI STRIP 1/2X4" R1547

## (undated) DEVICE — SYR 30ML LL W/O NDL 302832

## (undated) DEVICE — SU VICRYL 0 CTX 36" J370H

## (undated) RX ORDER — KETOROLAC TROMETHAMINE 30 MG/ML
INJECTION, SOLUTION INTRAMUSCULAR; INTRAVENOUS
Status: DISPENSED
Start: 2025-06-06

## (undated) RX ORDER — METHOCARBAMOL 500 MG/1
TABLET, FILM COATED ORAL
Status: DISPENSED
Start: 2025-06-06

## (undated) RX ORDER — ONDANSETRON 2 MG/ML
INJECTION INTRAMUSCULAR; INTRAVENOUS
Status: DISPENSED
Start: 2025-06-06

## (undated) RX ORDER — PROPOFOL 10 MG/ML
INJECTION, EMULSION INTRAVENOUS
Status: DISPENSED
Start: 2025-06-06

## (undated) RX ORDER — ACETAMINOPHEN 325 MG/1
TABLET ORAL
Status: DISPENSED
Start: 2025-06-06

## (undated) RX ORDER — FENTANYL CITRATE 50 UG/ML
INJECTION, SOLUTION INTRAMUSCULAR; INTRAVENOUS
Status: DISPENSED
Start: 2025-06-06

## (undated) RX ORDER — OXYCODONE HYDROCHLORIDE 5 MG/1
TABLET ORAL
Status: DISPENSED
Start: 2025-06-06

## (undated) RX ORDER — LIDOCAINE 40 MG/G
CREAM TOPICAL
Status: DISPENSED
Start: 2025-06-06

## (undated) RX ORDER — FENTANYL CITRATE-0.9 % NACL/PF 10 MCG/ML
PLASTIC BAG, INJECTION (ML) INTRAVENOUS
Status: DISPENSED
Start: 2025-06-06

## (undated) RX ORDER — HYDROMORPHONE HYDROCHLORIDE 1 MG/ML
INJECTION, SOLUTION INTRAMUSCULAR; INTRAVENOUS; SUBCUTANEOUS
Status: DISPENSED
Start: 2025-06-06